# Patient Record
Sex: FEMALE | Race: WHITE | Employment: FULL TIME | URBAN - METROPOLITAN AREA
[De-identification: names, ages, dates, MRNs, and addresses within clinical notes are randomized per-mention and may not be internally consistent; named-entity substitution may affect disease eponyms.]

---

## 2021-10-31 ENCOUNTER — APPOINTMENT (OUTPATIENT)
Dept: GENERAL RADIOLOGY | Age: 41
End: 2021-10-31
Attending: STUDENT IN AN ORGANIZED HEALTH CARE EDUCATION/TRAINING PROGRAM
Payer: COMMERCIAL

## 2021-10-31 ENCOUNTER — HOSPITAL ENCOUNTER (OUTPATIENT)
Age: 41
Setting detail: OBSERVATION
Discharge: HOME | End: 2021-11-01
Attending: STUDENT IN AN ORGANIZED HEALTH CARE EDUCATION/TRAINING PROGRAM | Admitting: INTERNAL MEDICINE
Payer: COMMERCIAL

## 2021-10-31 ENCOUNTER — APPOINTMENT (OUTPATIENT)
Dept: MRI IMAGING | Age: 41
End: 2021-10-31
Attending: STUDENT IN AN ORGANIZED HEALTH CARE EDUCATION/TRAINING PROGRAM
Payer: COMMERCIAL

## 2021-10-31 ENCOUNTER — APPOINTMENT (OUTPATIENT)
Dept: CT IMAGING | Age: 41
End: 2021-10-31
Attending: STUDENT IN AN ORGANIZED HEALTH CARE EDUCATION/TRAINING PROGRAM
Payer: COMMERCIAL

## 2021-10-31 ENCOUNTER — HOSPITAL ENCOUNTER (EMERGENCY)
Dept: MRI IMAGING | Age: 41
Discharge: HOME | End: 2021-10-31
Attending: STUDENT IN AN ORGANIZED HEALTH CARE EDUCATION/TRAINING PROGRAM
Payer: COMMERCIAL

## 2021-10-31 DIAGNOSIS — Z86.73 HISTORY OF TRANSIENT ISCHEMIC ATTACK (TIA): ICD-10-CM

## 2021-10-31 DIAGNOSIS — R07.9 CHEST PAIN, UNSPECIFIED TYPE: ICD-10-CM

## 2021-10-31 DIAGNOSIS — M47.22 CERVICAL SPONDYLOSIS WITH RADICULOPATHY: ICD-10-CM

## 2021-10-31 DIAGNOSIS — R29.898 WEAKNESS OF LEFT ARM: Primary | ICD-10-CM

## 2021-10-31 DIAGNOSIS — E78.2 MIXED HYPERLIPIDEMIA: ICD-10-CM

## 2021-10-31 PROBLEM — M54.2 NECK PAIN: Status: ACTIVE | Noted: 2021-10-31

## 2021-10-31 PROBLEM — E87.5 HYPERKALEMIA: Status: ACTIVE | Noted: 2021-10-31

## 2021-10-31 PROBLEM — R20.0 FACIAL NUMBNESS: Status: ACTIVE | Noted: 2021-10-31

## 2021-10-31 PROBLEM — I10 HTN (HYPERTENSION), BENIGN: Status: ACTIVE | Noted: 2021-10-31

## 2021-10-31 PROBLEM — R20.0 LEFT SIDED NUMBNESS: Status: ACTIVE | Noted: 2021-10-31

## 2021-10-31 PROBLEM — R29.810 FACIAL DROOP: Status: ACTIVE | Noted: 2021-10-31

## 2021-10-31 LAB
ALBUMIN SERPL-MCNC: 3.4 G/DL (ref 3.5–5)
ALBUMIN/GLOB SERPL: 0.7 {RATIO} (ref 1.1–2.2)
ALP SERPL-CCNC: 82 U/L (ref 45–117)
ALT SERPL-CCNC: 25 U/L (ref 12–78)
ANION GAP SERPL CALC-SCNC: 6 MMOL/L (ref 5–15)
ANION GAP SERPL CALC-SCNC: 8 MMOL/L (ref 5–15)
APTT PPP: 23.6 SEC (ref 22.1–31)
AST SERPL-CCNC: 64 U/L (ref 15–37)
BASOPHILS # BLD: 0.1 K/UL (ref 0–0.1)
BASOPHILS NFR BLD: 1 % (ref 0–1)
BILIRUB SERPL-MCNC: 1 MG/DL (ref 0.2–1)
BUN SERPL-MCNC: 15 MG/DL (ref 6–20)
BUN SERPL-MCNC: 15 MG/DL (ref 6–20)
BUN/CREAT SERPL: 15 (ref 12–20)
BUN/CREAT SERPL: 15 (ref 12–20)
CALCIUM SERPL-MCNC: 8.5 MG/DL (ref 8.5–10.1)
CALCIUM SERPL-MCNC: 8.8 MG/DL (ref 8.5–10.1)
CHLORIDE SERPL-SCNC: 107 MMOL/L (ref 97–108)
CHLORIDE SERPL-SCNC: 108 MMOL/L (ref 97–108)
CO2 SERPL-SCNC: 23 MMOL/L (ref 21–32)
CO2 SERPL-SCNC: 24 MMOL/L (ref 21–32)
CREAT SERPL-MCNC: 1.02 MG/DL (ref 0.55–1.02)
CREAT SERPL-MCNC: 1.03 MG/DL (ref 0.55–1.02)
D DIMER PPP FEU-MCNC: 0.67 MG/L FEU (ref 0–0.65)
DIFFERENTIAL METHOD BLD: ABNORMAL
EOSINOPHIL # BLD: 0.1 K/UL (ref 0–0.4)
EOSINOPHIL NFR BLD: 1 % (ref 0–7)
ERYTHROCYTE [DISTWIDTH] IN BLOOD BY AUTOMATED COUNT: 16.6 % (ref 11.5–14.5)
GLOBULIN SER CALC-MCNC: 4.7 G/DL (ref 2–4)
GLUCOSE BLD STRIP.AUTO-MCNC: 95 MG/DL (ref 65–117)
GLUCOSE SERPL-MCNC: 86 MG/DL (ref 65–100)
GLUCOSE SERPL-MCNC: 90 MG/DL (ref 65–100)
HCT VFR BLD AUTO: 33.1 % (ref 35–47)
HGB BLD-MCNC: 10.6 G/DL (ref 11.5–16)
IMM GRANULOCYTES # BLD AUTO: 0 K/UL (ref 0–0.04)
IMM GRANULOCYTES NFR BLD AUTO: 0 % (ref 0–0.5)
INR PPP: 1 (ref 0.9–1.1)
LYMPHOCYTES # BLD: 2.4 K/UL (ref 0.8–3.5)
LYMPHOCYTES NFR BLD: 32 % (ref 12–49)
MCH RBC QN AUTO: 20.7 PG (ref 26–34)
MCHC RBC AUTO-ENTMCNC: 32 G/DL (ref 30–36.5)
MCV RBC AUTO: 64.6 FL (ref 80–99)
MONOCYTES # BLD: 0.6 K/UL (ref 0–1)
MONOCYTES NFR BLD: 8 % (ref 5–13)
NEUTS SEG # BLD: 4.2 K/UL (ref 1.8–8)
NEUTS SEG NFR BLD: 58 % (ref 32–75)
NRBC # BLD: 0 K/UL (ref 0–0.01)
NRBC BLD-RTO: 0 PER 100 WBC
PLATELET # BLD AUTO: 461 K/UL (ref 150–400)
PLATELET COMMENTS,PCOM: ABNORMAL
PMV BLD AUTO: 10.7 FL (ref 8.9–12.9)
POTASSIUM SERPL-SCNC: 3.5 MMOL/L (ref 3.5–5.1)
POTASSIUM SERPL-SCNC: 7 MMOL/L (ref 3.5–5.1)
PROT SERPL-MCNC: 8.1 G/DL (ref 6.4–8.2)
PROTHROMBIN TIME: 10.6 SEC (ref 9–11.1)
RBC # BLD AUTO: 5.12 M/UL (ref 3.8–5.2)
RBC MORPH BLD: ABNORMAL
SERVICE CMNT-IMP: NORMAL
SODIUM SERPL-SCNC: 136 MMOL/L (ref 136–145)
SODIUM SERPL-SCNC: 140 MMOL/L (ref 136–145)
THERAPEUTIC RANGE,PTTT: NORMAL SECS (ref 58–77)
TROPONIN-HIGH SENSITIVITY: 7 NG/L (ref 0–51)
WBC # BLD AUTO: 7.4 K/UL (ref 3.6–11)

## 2021-10-31 PROCEDURE — G0378 HOSPITAL OBSERVATION PER HR: HCPCS

## 2021-10-31 PROCEDURE — 0042T CT CODE NEURO PERF W CBF: CPT

## 2021-10-31 PROCEDURE — 85379 FIBRIN DEGRADATION QUANT: CPT

## 2021-10-31 PROCEDURE — 94762 N-INVAS EAR/PLS OXIMTRY CONT: CPT

## 2021-10-31 PROCEDURE — 85610 PROTHROMBIN TIME: CPT

## 2021-10-31 PROCEDURE — 99285 EMERGENCY DEPT VISIT HI MDM: CPT

## 2021-10-31 PROCEDURE — 70450 CT HEAD/BRAIN W/O DYE: CPT

## 2021-10-31 PROCEDURE — 36415 COLL VENOUS BLD VENIPUNCTURE: CPT

## 2021-10-31 PROCEDURE — 72156 MRI NECK SPINE W/O & W/DYE: CPT

## 2021-10-31 PROCEDURE — 99218 HC RM OBSERVATION: CPT

## 2021-10-31 PROCEDURE — 70553 MRI BRAIN STEM W/O & W/DYE: CPT

## 2021-10-31 PROCEDURE — 74011250637 HC RX REV CODE- 250/637: Performed by: STUDENT IN AN ORGANIZED HEALTH CARE EDUCATION/TRAINING PROGRAM

## 2021-10-31 PROCEDURE — 74011000636 HC RX REV CODE- 636: Performed by: RADIOLOGY

## 2021-10-31 PROCEDURE — 82962 GLUCOSE BLOOD TEST: CPT

## 2021-10-31 PROCEDURE — 85730 THROMBOPLASTIN TIME PARTIAL: CPT

## 2021-10-31 PROCEDURE — 70496 CT ANGIOGRAPHY HEAD: CPT

## 2021-10-31 PROCEDURE — 93005 ELECTROCARDIOGRAM TRACING: CPT

## 2021-10-31 PROCEDURE — 71045 X-RAY EXAM CHEST 1 VIEW: CPT

## 2021-10-31 PROCEDURE — 74011250636 HC RX REV CODE- 250/636: Performed by: STUDENT IN AN ORGANIZED HEALTH CARE EDUCATION/TRAINING PROGRAM

## 2021-10-31 PROCEDURE — 74011250636 HC RX REV CODE- 250/636: Performed by: INTERNAL MEDICINE

## 2021-10-31 PROCEDURE — A9575 INJ GADOTERATE MEGLUMI 0.1ML: HCPCS | Performed by: RADIOLOGY

## 2021-10-31 PROCEDURE — 85025 COMPLETE CBC W/AUTO DIFF WBC: CPT

## 2021-10-31 PROCEDURE — 80053 COMPREHEN METABOLIC PANEL: CPT

## 2021-10-31 PROCEDURE — 74011250636 HC RX REV CODE- 250/636: Performed by: RADIOLOGY

## 2021-10-31 PROCEDURE — 84484 ASSAY OF TROPONIN QUANT: CPT

## 2021-10-31 RX ORDER — SODIUM CHLORIDE 9 MG/ML
75 INJECTION, SOLUTION INTRAVENOUS CONTINUOUS
Status: DISPENSED | OUTPATIENT
Start: 2021-10-31 | End: 2021-11-01

## 2021-10-31 RX ORDER — ASPIRIN 325 MG
325 TABLET ORAL ONCE
Status: COMPLETED | OUTPATIENT
Start: 2021-10-31 | End: 2021-10-31

## 2021-10-31 RX ORDER — GUAIFENESIN 100 MG/5ML
81 LIQUID (ML) ORAL DAILY
Status: DISCONTINUED | OUTPATIENT
Start: 2021-11-01 | End: 2021-11-01 | Stop reason: HOSPADM

## 2021-10-31 RX ORDER — ACETAMINOPHEN 325 MG/1
650 TABLET ORAL
Status: DISCONTINUED | OUTPATIENT
Start: 2021-10-31 | End: 2021-11-01 | Stop reason: HOSPADM

## 2021-10-31 RX ORDER — GADOTERATE MEGLUMINE 376.9 MG/ML
16 INJECTION INTRAVENOUS
Status: COMPLETED | OUTPATIENT
Start: 2021-10-31 | End: 2021-10-31

## 2021-10-31 RX ORDER — ACETAMINOPHEN 650 MG/1
650 SUPPOSITORY RECTAL
Status: DISCONTINUED | OUTPATIENT
Start: 2021-10-31 | End: 2021-11-01 | Stop reason: HOSPADM

## 2021-10-31 RX ADMIN — SODIUM CHLORIDE 75 ML/HR: 9 INJECTION, SOLUTION INTRAVENOUS at 20:55

## 2021-10-31 RX ADMIN — IOPAMIDOL 40 ML: 755 INJECTION, SOLUTION INTRAVENOUS at 17:14

## 2021-10-31 RX ADMIN — IOPAMIDOL 100 ML: 755 INJECTION, SOLUTION INTRAVENOUS at 17:14

## 2021-10-31 RX ADMIN — SODIUM CHLORIDE 1000 ML: 9 INJECTION, SOLUTION INTRAVENOUS at 17:38

## 2021-10-31 RX ADMIN — ASPIRIN 325 MG: 325 TABLET, FILM COATED ORAL at 17:38

## 2021-10-31 RX ADMIN — GADOTERATE MEGLUMINE 16 ML: 376.9 INJECTION, SOLUTION INTRAVENOUS at 18:48

## 2021-10-31 NOTE — H&P
[address]  [phone]    Admission History and Physical      NAME:       Rosa Harris   :       1980   MRN:      [id number]     PCP:      None     Date of service:   10/31/2021     Chief  Complaint: Left sided arm weakness, numbness    History Of Presenting Illness:       Ms. Houston Dubose is a 35 y.o. female who is being admitted for left sided numbness. Ms. Houston Dubose presented to our Emergency Department today complaining of a persistent left sided arm numbness and weakness. She also noted a left facial numbness also. At some point she was unable to hold a cup with her left hand. No speech changes or focal weakness. She had a TIA in september and was due to follow up with neurology and has been taking asprin, lipitor and plavix. CT code neuro, CTA head and neck were unremarkable. She has also been experiencing chest pressure associated with sone dyspnea. No fever or chills. She is fully vaccinated against CoV-19. She will be observed in hospital for further testing. No Known Allergies    Prior to Admission medications    Not on File       Past Medical History:   Diagnosis Date    HTN (hypertension), benign         No past surgical history on file. Social History     Tobacco Use    Smoking status: Never Smoker   Substance Use Topics    Alcohol use: Not on file        Family History   Problem Relation Age of Onset    Stroke Neg Hx         Review of Systems:    Constitutional ROS: no fever, chills, rigors or night sweats  Respiratory ROS: no cough, sputum, hemoptysis, dyspnea or pleuritic pain. Cardiovascular ROS: + chest pain but no palpitations, orthopnea, PND or syncope  Endocrine ROS: no polydispsia, polyuria, heat or cold intolerance or major weight change.   Gastrointestinal ROS: no dysphagia, abdominal pain, nausea, vomiting or diarrhea    Genito-Urinary ROS: no dysuria, frequency, hematuria, retention or flank pain  Musculoskeletal ROS: no joint pain, swelling or muscular tenderness  Neurological ROS: no headache, confusion, focal weakness   Psychiatric ROS: no depression, anxiety, mood swings  Dermatological ROS: no rash, pruritis, or urticaria  Heme-Lymph ROS: no swollen glands, bleeding    Examination:    Constitutional:    Visit Vitals  /73   Pulse 71   Temp 97.7 °F (36.5 °C)   Resp 16   Ht 5' 8\" (1.727 m)   Wt 84.4 kg (186 lb)   SpO2 100%   BMI 28.28 kg/m²         General:  Weak and ill looking patient in no acute distress  Eyes: Pink conjunctivae, PERRLA with no discharge. Normal eye movements  Ear, Nose, Mouth & Throat: No ottorrhea, rhinorrhea, non tender sinuses, dry mucous membranes  Respiratory:  No accessory muscle use, clear breath sounds without crackles or wheezes  Cardiovascular:  No JVD or murmurs, regular and normal S1, S2 without thrills, bruits or peripheral edema. GI & :  Soft abdomen, non-tender, non-distended, normoactive bowel sounds with no palpable organomegaly  Heme:  No cervical or axillary adenopathy. Musculoskeletal:  No cyanosis, clubbing, atrophy or deformities  Skin:  No rashes, bruising or ulcers   Neurological: Awake and alert, speech is clear, CNs 2-12 are grossly intact. Decreased power left side 4/5. Mild sensosory changes with no dermatomal pattern   Psychiatric:  Has a good insight and is oriented x 3  ________________________________________________________________________    Data Review:    Labs:    Recent Labs     10/31/21  1725   WBC 7.4   HGB 10.6*   HCT 33.1*   *     Recent Labs     10/31/21  1725      K 7.0*      CO2 23   GLU 86   BUN 15   CREA 1.03*   CA 8.8   ALB 3.4*   ALT 25     No components found for: GLPOC  No results for input(s): PH, PCO2, PO2, HCO3, FIO2 in the last 72 hours.   Recent Labs 10/31/21  1800   INR 1.0       Imaging Studies:      CT scans - reviewed     Personally reviewed 12 lead EKG: Normal rate, rhythm, axis and intervals. and No acute changes suggestive of ischemia    I have also reviewed available old medical records. Assessment & Impression:     Ms. Ashley Angela is a 35 y.o. female being evaluated for:     Active Problems:    Left sided numbness (10/31/2021)      Neck pain (10/31/2021)      Hyperkalemia (10/31/2021)      History of transient ischemic attack (TIA) (10/31/2021)      Chest pain (10/31/2021)      HTN (hypertension), benign (10/31/2021)      Facial numbness (10/31/2021)         Plan of management:    Left sided numbness (10/31/2021) / Neck pain (10/31/2021) /  Facial numbness (10/31/2021) / History of transient ischemic attack (TIA) (10/31/2021) POA: apparently had a TIA in 9/2021 in [place]. Had 'complete' work up done. CT scan code neuro, CTA head and neck and prelim MRi head and neck unremarkable. Observe in hospital. Consult neurology. Chest pain (10/31/2021) POA: associated with SOB. She says she had a complete work up including holter monitoring around 2018 in [facility]. Serial troponin. Consult cardiology. Resume Asprin    HTN (hypertension), benign (10/31/2021) POA: resume Lisinopril     Hyperkalemia (10/31/2021) POA: due to hemolysis.  Discussed with nurse who will re-draw blood sample to repeat test    Code Status:  Full    Surrogate decision maker: Family    Risk of deterioration: high      Total time spent for the care of the patient: [address] discussed with: Patient, Family, Nursing Staff and ED physician    Discussed:  Code Status, Care Plan and D/C Planning    Prophylaxis:  SCD's    Probable Disposition:  Home w/Family           ___________________________________________________    Attending Physician: Payal Porras MD

## 2021-10-31 NOTE — ED PROVIDER NOTES
Patient is a 64-year-old female with a history of TIA in September 2021, was scheduled to see a neurologist this upcoming week, who presents to the emergency department chief complaint of left arm weakness and left facial droop. She was last her baseline self at 9:30 PM last night when she went to bed. States she woke up around 2 AM and noticed her left arm had numbness and tingling and was weaker than usual.  Also had some neck \"tightness, as well as some mild chest discomfort last night prior to going to sleep. None currently. No past medical history on file. No past surgical history on file. No family history on file. Social History     Socioeconomic History    Marital status:      Spouse name: Not on file    Number of children: Not on file    Years of education: Not on file    Highest education level: Not on file   Occupational History    Not on file   Tobacco Use    Smoking status: Not on file   Substance and Sexual Activity    Alcohol use: Not on file    Drug use: Not on file    Sexual activity: Not on file   Other Topics Concern    Not on file   Social History Narrative    Not on file     Social Determinants of Health     Financial Resource Strain:     Difficulty of Paying Living Expenses:    Food Insecurity:     Worried About Running Out of Food in the Last Year:     [address] in the Last Year:    Transportation Needs:     Lack of Transportation (Medical):      Lack of Transportation (Non-Medical):    Physical Activity:     Days of Exercise per Week:     Minutes of Exercise per Session:    Stress:     Feeling of Stress :    Social Connections:     Frequency of Communication with Friends and Family:     Frequency of Social Gatherings with Friends and Family:     Attends L[employer] Services:     Active Member of Clubs or Organizations:     Attends Club or Organization Meetings:     Marital Status:    Intimate Partner Violence:     Fear of Current or Ex-Partner:     Emotionally Abused:     Physically Abused:     Sexually Abused: ALLERGIES: Patient has no allergy information on record. Review of Systems   Constitutional: Negative for fever. Respiratory: Negative for cough and shortness of breath. Cardiovascular: Positive for chest pain. Gastrointestinal: Negative for abdominal pain and vomiting. Genitourinary: Negative for dysuria. Musculoskeletal: Positive for neck pain. Negative for back pain. Skin: Negative for rash. Neurological: Positive for facial asymmetry, weakness and numbness. Negative for syncope and headaches. See HPI   All other systems reviewed and are negative. There were no vitals filed for this visit. Physical Exam  Vitals and nursing note reviewed. Constitutional:       General: She is not in acute distress. Appearance: She is well-developed. HENT:      Head: Normocephalic and atraumatic. Eyes:      Conjunctiva/sclera: Conjunctivae normal.   Cardiovascular:      Rate and Rhythm: Normal rate and regular rhythm. Heart sounds: Normal heart sounds. Pulmonary:      Effort: Pulmonary effort is normal. No respiratory distress. Breath sounds: Normal breath sounds. Abdominal:      Palpations: Abdomen is soft. Tenderness: There is no abdominal tenderness. There is no guarding. Musculoskeletal:         General: Normal range of motion. Cervical back: Normal range of motion and neck supple. Skin:     General: Skin is warm and dry. Neurological:      Mental Status: She is alert and oriented to person, place, and time. Cranial Nerves: No dysarthria. Sensory: No sensory deficit. Motor: Weakness (4+/5 motor strength in LUE. All others 5/5. ) and pronator drift (Left) present. No abnormal muscle tone.           MDM  Number of Diagnoses or Management Options  Weakness of left arm: new and requires workup  Risk of Complications, Morbidity, and/or Mortality  Presenting problems: high  Diagnostic procedures: high  Management options: high  General comments: Total critical care time spend exclusive of procedures:  31 minutes. Procedures    EKG interpretation: 4:39 PM  Rhythm: normal sinus rhythm; and regular . Rate (approx.): 76; Axis: normal; Intervals: normal ; ST/T wave: normal; EKG documented and interpreted by Flores Eddy MD, ED MD.      5:13 PM  I spoke with Dr. Koko Feng, the telemetry neurologist on call. Patient outside the TPA window. Recommends bringing in for full stroke work-up. Would also obtain MRIs of the brain and cervical spine with and without contrast to look for any demyelinating process. Normal saline, aspirin, permissive hypertension. Perfect Serve Consult for Admission  6:05 PM    ED Room Number: ER12/12  Patient Name and age:  Ludmila Falcon 35 y.o.  female  Working Diagnosis:   1. Weakness of left arm        COVID-19 Suspicion:  no  Sepsis present:  no  Reassessment needed: no  Code Status:  Full Code  Readmission: no  Isolation Requirements:  no  Recommended Level of Care:  telemetry  Department:[facility] ED - [phone]  Other: Presented as a Code S, level 2. Teleneurology recommends admission for stroke work-up and rule out, also recommends MRIs with and without contrast (I've ordered them) to look for demyelination.

## 2021-10-31 NOTE — ED TRIAGE NOTES
Patient arrives with c/o of intermittent midsternal chest pain, left sided head/neck \"pressure\", left facial and arm numbness, and left sided weakness. Denies change in vision, speech. Hx of TIA one month ago at [facility] ED. Was prescribed plavix for 20 days. Finished plavix last week. Signs and symptoms: See above  VAN score: Negative  Last Known Well: 0200  Blood Glucose (EMS acceptable): 95   Blood Pressure (EMS acceptable): 146/87  Anticoagulants: No. Took plavix until last week     Code Stroke Level 2 initiated at 1649.

## 2021-11-01 ENCOUNTER — APPOINTMENT (OUTPATIENT)
Dept: NON INVASIVE DIAGNOSTICS | Age: 41
End: 2021-11-01
Attending: INTERNAL MEDICINE
Payer: COMMERCIAL

## 2021-11-01 ENCOUNTER — APPOINTMENT (OUTPATIENT)
Dept: NON INVASIVE DIAGNOSTICS | Age: 41
End: 2021-11-01
Attending: SPECIALIST
Payer: COMMERCIAL

## 2021-11-01 VITALS
SYSTOLIC BLOOD PRESSURE: 124 MMHG | DIASTOLIC BLOOD PRESSURE: 82 MMHG | BODY MASS INDEX: 28.19 KG/M2 | OXYGEN SATURATION: 99 % | HEIGHT: 68 IN | HEART RATE: 72 BPM | TEMPERATURE: 98.2 F | WEIGHT: 186 LBS | RESPIRATION RATE: 20 BRPM

## 2021-11-01 PROBLEM — M47.22 CERVICAL SPONDYLOSIS WITH RADICULOPATHY: Status: ACTIVE | Noted: 2021-11-01

## 2021-11-01 LAB
ATRIAL RATE: 76 BPM
CALCULATED P AXIS, ECG09: 48 DEGREES
CALCULATED R AXIS, ECG10: 51 DEGREES
CALCULATED T AXIS, ECG11: 47 DEGREES
CHOLEST SERPL-MCNC: 174 MG/DL
DIAGNOSIS, 93000: NORMAL
ECHO AV ANNULUS DIAM: 2.53 CM
ECHO AV AREA PEAK VELOCITY: 1.53 CM2
ECHO AV AREA/BSA PEAK VELOCITY: 0.8 CM2/M2
ECHO AV PEAK GRADIENT: 9.66 MMHG
ECHO AV PEAK VELOCITY: 155.43 CM/S
ECHO EST RA PRESSURE: 5 MMHG
ECHO LA MAJOR AXIS: 3.8 CM
ECHO LA MINOR AXIS: 1.92 CM
ECHO LV E' LATERAL VELOCITY: 12.6 CM/S
ECHO LV E' SEPTAL VELOCITY: 9.88 CM/S
ECHO LV INTERNAL DIMENSION DIASTOLIC: 4.66 CM (ref 3.9–5.3)
ECHO LV INTERNAL DIMENSION SYSTOLIC: 2.55 CM
ECHO LV IVSD: 1.26 CM (ref 0.6–0.9)
ECHO LV MASS 2D: 188.5 G (ref 67–162)
ECHO LV MASS INDEX 2D: 95.2 G/M2 (ref 43–95)
ECHO LV POSTERIOR WALL DIASTOLIC: 0.97 CM (ref 0.6–0.9)
ECHO LVOT DIAM: 1.87 CM
ECHO LVOT PEAK GRADIENT: 3.01 MMHG
ECHO LVOT PEAK VELOCITY: 86.71 CM/S
ECHO MV A VELOCITY: 71.21 CM/S
ECHO MV E DECELERATION TIME (DT): 125.71 MS
ECHO MV E VELOCITY: 89.47 CM/S
ECHO MV E/A RATIO: 1.26
ECHO MV E/E' LATERAL: 7.1
ECHO MV E/E' RATIO (AVERAGED): 8.08
ECHO MV E/E' SEPTAL: 9.06
ECHO PV PEAK INSTANTANEOUS GRADIENT SYSTOLIC: 2.48 MMHG
ECHO RIGHT VENTRICULAR SYSTOLIC PRESSURE (RVSP): 29.73 MMHG
ECHO TV REGURGITANT MAX VELOCITY: 248.65 CM/S
ECHO TV REGURGITANT PEAK GRADIENT: 24.73 MMHG
EST. AVERAGE GLUCOSE BLD GHB EST-MCNC: 120 MG/DL
HBA1C MFR BLD: 5.8 % (ref 4–5.6)
HDLC SERPL-MCNC: 52 MG/DL
HDLC SERPL: 3.3 {RATIO} (ref 0–5)
LDLC SERPL CALC-MCNC: 99.2 MG/DL (ref 0–100)
P-R INTERVAL, ECG05: 152 MS
Q-T INTERVAL, ECG07: 398 MS
QRS DURATION, ECG06: 82 MS
QTC CALCULATION (BEZET), ECG08: 447 MS
STRESS ANGINA INDEX: 0
STRESS BASELINE DIAS BP: 82 MMHG
STRESS BASELINE HR: 75 BPM
STRESS BASELINE SYS BP: 124 MMHG
STRESS ESTIMATED WORKLOAD: 10.1 METS
STRESS EXERCISE DUR MIN: NORMAL
STRESS PEAK DIAS BP: 90 MMHG
STRESS PEAK SYS BP: 160 MMHG
STRESS PERCENT HR ACHIEVED: 88 %
STRESS POST PEAK HR: 155 BPM
STRESS RATE PRESSURE PRODUCT: NORMAL BPM*MMHG
STRESS TARGET HR: 177 BPM
TRIGL SERPL-MCNC: 114 MG/DL (ref ?–150)
TROPONIN-HIGH SENSITIVITY: 8 NG/L (ref 0–51)
VENTRICULAR RATE, ECG03: 76 BPM
VLDLC SERPL CALC-MCNC: 22.8 MG/DL

## 2021-11-01 PROCEDURE — 83036 HEMOGLOBIN GLYCOSYLATED A1C: CPT

## 2021-11-01 PROCEDURE — 74011250637 HC RX REV CODE- 250/637: Performed by: PSYCHIATRY & NEUROLOGY

## 2021-11-01 PROCEDURE — 99218 HC RM OBSERVATION: CPT

## 2021-11-01 PROCEDURE — G0378 HOSPITAL OBSERVATION PER HR: HCPCS

## 2021-11-01 PROCEDURE — 97165 OT EVAL LOW COMPLEX 30 MIN: CPT

## 2021-11-01 PROCEDURE — 99243 OFF/OP CNSLTJ NEW/EST LOW 30: CPT | Performed by: PSYCHIATRY & NEUROLOGY

## 2021-11-01 PROCEDURE — 74011250637 HC RX REV CODE- 250/637: Performed by: INTERNAL MEDICINE

## 2021-11-01 PROCEDURE — 93306 TTE W/DOPPLER COMPLETE: CPT | Performed by: SPECIALIST

## 2021-11-01 PROCEDURE — 93018 CV STRESS TEST I&R ONLY: CPT | Performed by: SPECIALIST

## 2021-11-01 PROCEDURE — 97535 SELF CARE MNGMENT TRAINING: CPT

## 2021-11-01 PROCEDURE — 93017 CV STRESS TEST TRACING ONLY: CPT

## 2021-11-01 PROCEDURE — 84484 ASSAY OF TROPONIN QUANT: CPT

## 2021-11-01 PROCEDURE — 93016 CV STRESS TEST SUPVJ ONLY: CPT | Performed by: SPECIALIST

## 2021-11-01 PROCEDURE — 97161 PT EVAL LOW COMPLEX 20 MIN: CPT

## 2021-11-01 PROCEDURE — 80061 LIPID PANEL: CPT

## 2021-11-01 PROCEDURE — 93306 TTE W/DOPPLER COMPLETE: CPT

## 2021-11-01 PROCEDURE — 36415 COLL VENOUS BLD VENIPUNCTURE: CPT

## 2021-11-01 PROCEDURE — 99244 OFF/OP CNSLTJ NEW/EST MOD 40: CPT | Performed by: SPECIALIST

## 2021-11-01 PROCEDURE — 97116 GAIT TRAINING THERAPY: CPT

## 2021-11-01 RX ORDER — GUAIFENESIN 100 MG/5ML
81 LIQUID (ML) ORAL DAILY
Qty: 30 TABLET | Refills: 1 | Status: SHIPPED | OUTPATIENT
Start: 2021-11-02 | End: 2021-12-02

## 2021-11-01 RX ORDER — ATORVASTATIN CALCIUM 20 MG/1
20 TABLET, FILM COATED ORAL DAILY
Status: DISCONTINUED | OUTPATIENT
Start: 2021-11-01 | End: 2021-11-01 | Stop reason: HOSPADM

## 2021-11-01 RX ORDER — ATORVASTATIN CALCIUM 20 MG/1
20 TABLET, FILM COATED ORAL DAILY
Qty: 30 TABLET | Refills: 1 | Status: SHIPPED | OUTPATIENT
Start: 2021-11-02 | End: 2021-12-02

## 2021-11-01 RX ORDER — CYCLOBENZAPRINE HCL 10 MG
10 TABLET ORAL 2 TIMES DAILY
Qty: 28 TABLET | Refills: 0 | Status: SHIPPED | OUTPATIENT
Start: 2021-11-01 | End: 2021-11-15

## 2021-11-01 RX ADMIN — ATORVASTATIN CALCIUM 20 MG: 20 TABLET, FILM COATED ORAL at 14:37

## 2021-11-01 RX ADMIN — ASPIRIN 81 MG: 81 TABLET, CHEWABLE ORAL at 08:50

## 2021-11-01 NOTE — PROGRESS NOTES
OCCUPATIONAL THERAPY EVALUATION/DISCHARGE  Patient: Trinity Gatica (46 y.o. female)  Date: 11/1/2021  Primary Diagnosis: Left sided numbness [R20.0]       Precautions:    (BEFAST)    ASSESSMENT  Based on the objective data described below, the patient presents with near baseline ADL performance and mobility following admission for L side weakness and L facial and UE numbness. MRI negative for acute infarct and pt with h/o TIA 1 month ago. Today pt received in bed, reporting improvement in symptoms. She is able to demonstrate functional and equal ROM, coordination, and strength in bilateral UEs to complete serial ADL tasks. Pt mobilizes to bathroom for toileting and grooming with independence. No LOB noted during standing portions of ADLs. No visual deficits noted. Pt receptive to all education, including BEFAST. No further skilled OT services indicated at this time. Current Level of Function (ADLs/self-care): independent    Functional Outcome Measure: The patient scored Total A-D  Total A-D (Motor Function): 66/66 on the Fugl-Mccullough Assessment which is indicative of no impairment in upper extremity functional status. Other factors to consider for discharge: recent TIA; active and works     PLAN :  Recommendation for discharge: (in order for the patient to meet his/her long term goals)  No skilled occupational therapy/ follow up rehabilitation needs identified at this time. This discharge recommendation:  Has been made in collaboration with the attending provider and/or case management    IF patient discharges home will need the following DME: none       SUBJECTIVE:   Patient stated I just went through this a month ago.     OBJECTIVE DATA SUMMARY:   HISTORY:   Past Medical History:   Diagnosis Date    HTN (hypertension), benign    No past surgical history on file.     Prior Level of Function/Environment/Context: active; independent; works as a   Expanded or extensive additional review of patient history:     Home Situation  Home Environment: Private residence  # Steps to Enter: 6  Rails to Enter: Yes  One/Two Story Residence: One story  Living Alone: No  Support Systems: Spouse/Significant Other, Child(alexandra)  Patient Expects to be Discharged to[de-identified] House  Current DME Used/Available at Home: None  Tub or Shower Type: Shower    Hand dominance: Left    EXAMINATION OF PERFORMANCE DEFICITS:  Cognitive/Behavioral Status:  Neurologic State: Alert  Orientation Level: Oriented X4  Cognition: Appropriate decision making; Appropriate for age attention/concentration; Appropriate safety awareness; Follows commands  Perception: Appears intact  Perseveration: No perseveration noted  Safety/Judgement: Awareness of environment; Fall prevention;Good awareness of safety precautions; Home safety; Insight into deficits    Hearing: Auditory  Auditory Impairment: None    Vision/Perceptual:    Tracking: Able to track stimulus in all quadrants w/o difficulty    Visual Fields:  (WFLs)  Diplopia: No    Acuity: Within Defined Limits      Range of Motion:  WFLs    Strength:  WFLs    Coordination:  WFLs  Fine Motor Skills-Upper: Left Intact; Right Intact    Gross Motor Skills-Upper: Left Intact; Right Intact    Balance:  Sitting: Intact  Standing: Intact    Functional Mobility and Transfers for ADLs:  Bed Mobility:  Rolling: Independent  Supine to Sit: Independent  Sit to Supine: Independent  Scooting: Independent    Transfers:  Sit to Stand: Independent  Stand to Sit: Independent  Toilet Transfer : Independent    ADL Assessment:  Feeding: Independent    Oral Facial Hygiene/Grooming: Independent    Bathing: Independent    Upper Body Dressing: Independent    Lower Body Dressing: Independent    Toileting: Independent    ADL Intervention and task modifications:  Grooming  Grooming Assistance: Independent  Position Performed: Standing  Washing Hands: Independent    Toileting  Toileting Assistance: Independent  Bladder Hygiene: Independent  Bowel Hygiene: Independent  Clothing Management: Independent    Cognitive Retraining  Safety/Judgement: Awareness of environment; Fall prevention;Good awareness of safety precautions; Home safety; Insight into deficits    Functional Measure:  Fugl-Mccullough Assessment of Motor Recovery after Stroke:   Reflex Activity  Flexors/Biceps/Fingers: Can be elicited  Extensors/Triceps: Can be elicited  Reflex Subtotal: 4    Volitional Movement Within Synergies  Shoulder Retraction: Full  Shoulder Elevation: Full  Shoulder Abduction (90 degrees): Full  Shoulder External Rotation: Full  Elbow Flexion: Full  Forearm Supination: Full  Shoulder Adduction/Internal Rotation: Full  Elbow Extension: Full  Forearm Pronation: Full  Subtotal: 18    Volitional Movement Mixing Synergies  Hand to Lumbar Spine: Full  Shoulder Flexion (0-90 degrees): Full  Pronation-Supination: Full  Subtotal: 6    Volitional Movement With Little or No Synergy  Shoulder Abduction (0-90 degrees): Full  Shoulder Flexion ( degrees): Full  Pronation/Supination: Full  Subtotal : 6    Normal Reflex Activity  Biceps, Triceps, Finger Flexors: Full  Subtotal : 2    Upper Extremity Total   Upper Extremity Total: 36    Wrist  Stability at 15 Degree Dorsiflexion: Full  Repeated Dorsiflexion/ Volar Flexion: Full  Stability at 15 Degree Dorsiflexion: Full  Repeated Dorsiflexion/ Volar Flexion: Full  Circumduction: Full  Wrist Total: 10    Hand  Mass Flexion: Full  Mass Extension: Full  Grasp A: Full  Grasp B: Full  Grasp C: Full  Grasp D: Full  Grasp E: Full  Hand Total: 14    Coordination/Speed  Tremor: None  Dysmetria: None  Time: <1s  Coordination/Speed Total : 6    Total A-D  Total A-D (Motor Function): 66/66     This is a reliable/valid measure of arm function after a neurological event. It has established value to characterize functional status and for measuring spontaneous and therapy-induced recovery; tests proximal and distal motor functions. Fugl-Mccullough Assessment  UE scores recorded between five and 30 days post neurologic event can be used to predict UE recovery at six months post neurologic event. Severe = 0-21 points   Moderately Severe = 22-33 points   Moderate = 34-47 points   Mild = 48-66 points  ELEN Duarte, YA Brandt, & BOB Tai (1992). Measurement of motor recovery after stroke: Outcome assessment and sample size requirements.  Stroke, 23, pp. 4841-2947.   ------------------------------------------------------------------------------------------------------------------------------------------------------------------  MCID:  Stroke:   Cortez Valenzuela et al, 2001; n = 171; mean age 79 (6) years; assessed within 16 (12) days of stroke, Acute Stroke)  FMA Motor Scores from Admission to Discharge   10 point increase in FMA Upper Extremity = 1.5 change in discharge FIM   10 point increase in FMA Lower Extremity = 1.9 change in discharge FIM  MDC:   Stroke:   Talya Gauthier et al, 2008, n = 14, mean age = 59.9 (14.6) years, assessed on average 14 (6.5) months post stroke, Chronic Stroke)   FMA = 5.2 points for the Upper Extremity portion of the assessment     Occupational Therapy Evaluation Charge Determination   History Examination Decision-Making   LOW Complexity : Brief history review  LOW Complexity : 1-3 performance deficits relating to physical, cognitive , or psychosocial skils that result in activity limitations and / or participation restrictions  LOW Complexity : No comorbidities that affect functional and no verbal or physical assistance needed to complete eval tasks       Based on the above components, the patient evaluation is determined to be of the following complexity level: LOW   Pain Rating:  Pt reporting minimal pain    Activity Tolerance:   Good    After treatment patient left in no apparent distress:    Sitting in chair and Call bell within reach    COMMUNICATION/EDUCATION:   The patients plan of care was discussed with: Physical therapist and Registered nurse. Patient and/or family was verbally educated on the BE FAST acronym for signs/symptoms of CVA and TIA. Informed patient to refer to the Stroke Binder for further BE FAST information. All questions answered with patient indicating good understanding.      Thank you for this referral.  Shashi Hi OT  Time Calculation: 23 mins

## 2021-11-01 NOTE — PROGRESS NOTES
Spiritual Care Partner Volunteer visited patient at [address] in [address] [place], [place] [address]y 2 on 11/1/2021   Documented by:  CRYSTAL AngelDiv.  [phone])

## 2021-11-01 NOTE — PROGRESS NOTES
TRANSFER - IN REPORT:    Verbal report received from SALLIE Castillo on Carmela Elena being received from ED for routine progression of care      Report consisted of patients Situation, Background, Assessment and   Recommendations(SBAR). Information from the following report(s) SBAR, Intake/Output, MAR, Recent Results, Med Rec Status and Cardiac Rhythm . was reviewed with the receiving nurse. Opportunity for questions and clarification was provided. Assessment completed upon patients arrival to unit and care assumed. Stroke education provided to patient and the following topics were discussed    1. Patients personal risk factors for stroke are hypertension    2. Warning signs of stroke:        * Sudden numbness or weakness of the face, arm or leg, especially on one side of the body            * Sudden confusion, trouble speaking or understanding        * Sudden trouble seeing in one or both eyes        * Sudden trouble walking, dizziness, loss of balance or coordination        * Sudden severe headache with no known cause      3. Importance of activation emergency medicalsServices (911) immediately if experience any warning signs of stroke. 4. Be sure and schedule a follow-up appointment with your primary care doctor or any specialists as instructed. 5. You must take medicine every day to treat your risk factors for stroke. Be sure to take your medicines exactly as your doctor tells you: no more, no less. Know what your medicines are for, what they do. Anti-thrombotics /anticoagulants can help prevent strokes. You are taking the following medicine(s): none. 6.  Smoking and second-hand smoke greatly increase your risk of stroke, cardiovascular disease and death. Smoking history never. 7. Information provided was [facility] Stroke Education Binder, Stroke Handouts or Verbal Education.     8. Documentation of teaching completed in Patient Education Activity and on Care Plan with teaching response noted? Yes. This patient was assisted with intentional toileting every 2 hours during this shift as appropriate. Documentation of ambulation and output reflected on flow sheet as appropriate. Purposeful hourly rounding was completed using AIDET and 5 Ps. Outcomes of PHR documented as they occurred. Bed alarm in use as appropriate. Dual suction and ambubag in place. 0700 - Bedside and verbal shift change report given to Emma Albarado RN (oncoming nurse) by Maya Rosas RN (offgoing nurse). Report included the following information: SBAR, Kardex, Intake/Output, MAR, Recent Results, and Med Rec Status.

## 2021-11-01 NOTE — ROUTINE PROCESS
SLP spoke with patient. She had no speech or swallowing concerns. On a regular diet, after she passed the STAND. C/o headache and tinnitus. REferred her to MD. SLP evaluation deferred at this time.

## 2021-11-01 NOTE — DISCHARGE SUMMARY
Sarath Arnoldelsen polly [place] 77  [address]  Tel: [phone]    Physician Discharge Summary    Patient ID:    Milka Maher  Age:              35 y.o.    : 1980  MRN:             [id number]     PCP: None     Date of Admission: 10/31/2021    Date of Discharge:  2021    Principal admission Diagnosis:   Left sided numbness [R20.0]    Discharge Diagnoses:  Principal Problem:    Cervical spondylosis with radiculopathy (2021)    Left sided numbness (10/31/2021)    Neck pain (10/31/2021)    History of transient ischemic attack (TIA) (10/31/2021)    Chest pain (10/31/2021)    HTN (hypertension), benign (10/31/2021)    Facial numbness (10/31/2021)    Hospital Course:     Ms. Donnie Hernández is a 35 y.o. admitted to [facility] and treated for the following:    Cervical spondylosis with radiculopathy (2021) / Left sided numbness (10/31/2021) / Neck pain (10/31/2021) /  Facial numbness (10/31/2021) / History of transient ischemic attack (TIA) (10/31/2021) POA: apparently had a TIA in 2021. During this admission, a CT scan code neuro, CTA head and neck unremarkable. MRi brain neg for CVA. MRi neck showed a mild spondylosis C4-5 C5-6. Seen by neurology who recommended muscle relaxants and NSAIDs. Follow up as needed       Chest pain (10/31/2021) POA: associated with SOB. She says she had a complete work up including holter monitoring around 2018 in [facility]. Serial troponin neg. Echo showed a normal EF. Seen by cardiology and had a normal stress test. Continue Asprin, Lipitor     HTN (hypertension), benign (10/31/2021) POA: BP stable. Resume Lisinopril      Hyperkalemia (10/31/2021) POA: due to hemolysis.  Repeat was normal.     Discharge Exam:    Visit Vitals  /82   Pulse 72   Temp 98.2 °F (36.8 °C)   Resp 20   Ht 5' 8\" (1.727 m)   Wt 84.4 kg (186 lb)   SpO2 99%   BMI 28.28 kg/m²        Patient has been seen and examined. General: well looking and in no acute distress  Pulm: clear breath sounds without wheezes  Card: no murmurs, normal S1, S2 without thrills, bruits   Abd:    soft, non-tender, normoactive bowel sounds  Skin: no rashes and skin turgor is good  Neuro: awake, alert and has a non focal     Activity: Activity as tolerated    Diet: Regular Diet    Current Discharge Medication List      START taking these medications    Details   aspirin 81 mg chewable tablet Take 1 Tablet by mouth daily for 30 days. Qty: 30 Tablet, Refills: 1      atorvastatin (LIPITOR) 20 mg tablet Take 1 Tablet by mouth daily for 30 days. Qty: 30 Tablet, Refills: 1      cyclobenzaprine (FLEXERIL) 10 mg tablet Take 1 Tablet by mouth two (2) times a day for 14 days. Qty: 28 Tablet, Refills: 0             Follow-up Information     Follow up With Specialties Details Why Contact Info    None    None ([phone]) Patient stated that they have no PCP      Mary Jo Mayorga MD Neurology Call to schedule a neurology follow up as needed [address] [place] [address] [phone]            Follow-up tests or labs: None    Discharge Condition: Stable    Disposition: home    Time taken to arrange discharge:  35 minutes.     Signed:  Jacque Sampson MD     [facility]  11/1/2021   4:50 PM

## 2021-11-01 NOTE — PROGRESS NOTES
Problem: Mobility Impaired (Adult and Pediatric)  Goal: *Acute Goals and Plan of Care (Insert Text)  Outcome: Progressing Towards Goal   PHYSICAL THERAPY EVALUATION/DISCHARGE  Patient: Gary Avila (78 y.o. female)  Date: 11/1/2021  Primary Diagnosis: Left sided numbness [R20.0]        Precautions:    (BEFAST)      ASSESSMENT  Based on the objective data described below, the patient presents with admission due to L sided facial/UE numbness. pt with hx of TIA 1 mo ago. CT and MRI negative with this episode. Pt received sitting on EOB requesting to go to the bathroom. Pt educated with [facility] protocol and verbalizing good understanding. SBA to Independent with gait of l50' in room. Scored 56/56 on [employer].  Toileted independently and left in NAD in recliner. Functional Outcome Measure: The patient scored 56/56 on the Sena outcome measure which is indicative of no fall risk. Other factors to consider for discharge: per above     Further skilled acute physical therapy is not indicated at this time. PLAN :  Recommendation for discharge: (in order for the patient to meet his/her long term goals)  No skilled physical therapy/ follow up rehabilitation needs identified at this time. This discharge recommendation:  Has not yet been discussed the attending provider and/or case management    IF patient discharges home will need the following DME: none       SUBJECTIVE:   Patient stated Pb Odonnell had a TIA last month.     OBJECTIVE DATA SUMMARY:   HISTORY:    Past Medical History:   Diagnosis Date    HTN (hypertension), benign    No past surgical history on file.     Prior level of function: Independent  Personal factors and/or comorbidities impacting plan of care: per above and below    Home Situation  Home Environment: Private residence  # Steps to Enter: 6  Rails to Enter: Yes  One/Two Story Residence: One story  Living Alone: No  Support Systems: Spouse/Significant Other, Child(alexandra)  Patient Expects to be Discharged to[de-identified] House  Current DME Used/Available at Home: None  Tub or Shower Type: Shower    EXAMINATION/PRESENTATION/DECISION MAKING:   Critical Behavior:  Neurologic State: Alert  Orientation Level: Oriented X4  Cognition: Appropriate decision making, Appropriate for age attention/concentration, Appropriate safety awareness, Follows commands  Safety/Judgement: Awareness of environment, Fall prevention, Good awareness of safety precautions, Home safety, Insight into deficits  Hearing: Auditory  Auditory Impairment: None  Skin:  IV  Edema: WNL  Range Of Motion:  AROM: Within functional limits                       Strength:    Strength:  Within functional limits                    Tone & Sensation:   Tone: Normal                              Coordination:  Coordination: Within functional limits  Vision:   Tracking: Able to track stimulus in all quadrants w/o difficulty  Visual Fields:  (WFLs)  Diplopia: No  Acuity: Within Defined Limits  Functional Mobility:  Bed Mobility:  Rolling: Independent  Supine to Sit: Independent  Sit to Supine: Independent  Scooting: Independent  Transfers:  Sit to Stand: Independent  Stand to Sit: Independent                       Balance:   Sitting: Intact  Standing: Intact  Ambulation/Gait Training:  Distance (ft): 150 Feet (ft)  Assistive Device: Gait belt                                              Functional Measure:  Sena Balance Test:    Sitting to Standin  Standing Unsupported: 4  Sitting with Back Unsupported: 4  Standing to Sittin  Transfers: 4  Standing Unsupported with Eyes Closed: 4  Standing Unsupported with Feet Together: 4  Reach Forward with Outstretched Arm: 4   Object: 4  Turn to Look Over Shoulders: 4  Turn 360 Degrees: 4  Alternate Foot on Step/Stool: 4  Standing Unsupported One Foot in Front: 4  Stand on One Le  Total: 56/56         56=Maximum possible score;   0-20=High fall risk  21-40=Moderate fall risk 41-56=Low fall risk               Physical Therapy Evaluation Charge Determination   History Examination Presentation Decision-Making   MEDIUM  Complexity : 1-2 comorbidities / personal factors will impact the outcome/ POC  LOW Complexity : 1-2 Standardized tests and measures addressing body structure, function, activity limitation and / or participation in recreation  LOW Complexity : Stable, uncomplicated  Other outcome measures barthel  LOW       Based on the above components, the patient evaluation is determined to be of the following complexity level: LOW     Pain Rating:  none    Activity Tolerance:   Good      After treatment patient left in no apparent distress:   Sitting in chair and Call bell within reach and alarm set    COMMUNICATION/EDUCATION:   The patients plan of care was discussed with: Occupational therapist and Registered nurse. Fall prevention education was provided and the patient/caregiver indicated understanding., Patient/family have participated as able in goal setting and plan of care. , and Patient/family agree to work toward stated goals and plan of care.     Thank you for this referral.  Santos Watson, PT   Time Calculation: 23 mins

## 2021-11-01 NOTE — CONSULTS
NEUROLOGY CONSULT NOTE    Patient ID:  Marcella Souza  [id number]  46 y.o.  1980    Date of Consultation:  November 1, 2021    Referring Physician: Dr. Stewart Berger    Reason for Consultation:  Numbness and weakness    History of Present Illness:     Patient Active Problem List    Diagnosis Date Noted    Left sided numbness 10/31/2021    Neck pain 10/31/2021    Hyperkalemia 10/31/2021    History of transient ischemic attack (TIA) 10/31/2021    Chest pain 10/31/2021    HTN (hypertension), benign 10/31/2021    Facial numbness 10/31/2021     Past Medical History:   Diagnosis Date    HTN (hypertension), benign       No past surgical history on file. Prior to Admission medications    Not on File     No Known Allergies   Social History     Tobacco Use    Smoking status: Never Smoker   Substance Use Topics    Alcohol use: Not on file      Family History   Problem Relation Age of Onset    Stroke Neg Hx         Subjective:      Marcella Souza is a 35 y.o. with history of hypertension and TIA (September 2021) was admitted from the ER for left arm weakness and left facial numbness. Patient woke up at 2 in the morning and noted that her left arm was numb and tingly and was weaker than usual.  Also has some neck tightness as well as some mild chest discomfort prior to sleeping. Patient went to the emergency room. In the ER blood pressure was 146/87. NIH stroke scale was 5. Labs revealed decreased hemoglobin at 10.6, decreased hematocrit, MCV, MCH and elevated platelet at 808. LDL is 99.2. Unremarkable CMP, troponin. Slightly elevated hemoglobin A1c at 5.8 and D-dimer at 0.67. Head CT without contrast did not reveal any acute process. CT perfusion study did not reveal any abnormality. Head and neck CTA did not reveal any flow-limiting stenosis or aneurysm. No ICA occlusion. Chest x-ray was negative.   Brain MRI with and without contrast was essentially normal.  No abnormal enhancement. Cervical MRI with and without contrast did not reveal any abnormal enhancement. No cord lesions. Some straightening of the normal lordosis compatible with muscle spasm. Mild spondylosis and C5-6 showing mild foraminal compromise by spur. When seen, patient just reports occiput and neck pain. Outside reports reviewed: ER records, radiology reports, lab reports. Review of Systems:    Pertinent items are noted in HPI. Objective:     Patient Vitals for the past 8 hrs:   BP Temp Pulse Resp SpO2 Weight   11/01/21 0809 (!) 110/55 98.2 °F (36.8 °C) 63 20 100 %    11/01/21 0800   63      11/01/21 0700   61      11/01/21 0431 105/65 98.4 °F (36.9 °C) 69 15 100 % 89.5 kg (197 lb 5 oz)     PHYSICAL EXAM:    NEUROLOGICAL EXAM:    Appearance: The patient is well developed, well nourished, provides a coherent history and is in no acute distress. Mental Status: Oriented to time, place and person. Fluent, no aphasia or dysarthria. Mood and affect appropriate. Cranial Nerves:   Intact visual fields. FLORENTINO, EOM's full, no nystagmus, no ptosis. Facial sensation is normal. Corneal reflexes are intact. Facial movement is symmetric. Hearing is normal bilaterally. Palate is midline with normal elevation. Sternocleidomastoid and trapezius muscles are normal. Tongue is midline. Motor:  5/5 strength in upper and lower proximal and distal muscles. Normal bulk and tone. No fasciculations. No pronator drift. Reflexes:   Deep tendon reflexes 1+/4 and symmetrical. Downgoing toes. Sensory:   Normal to cold and vibration. Gait:  Not tested. Tremor:   No tremor noted. Cerebellar:  Intact FTN/BRODIE/HTS.        Imaging  CT Head,, head/neck CTA, brain MRI, cervical MRI: reviewed    Lab Review    Recent Results (from the past 24 hour(s))   GLUCOSE, POC    Collection Time: 10/31/21  4:54 PM   Result Value Ref Range    Glucose (POC) 95 65 - 117 mg/dL    Performed by FANNY JENNINGS    CBC WITH AUTOMATED DIFF    Collection Time: 10/31/21  5:25 PM   Result Value Ref Range    WBC 7.4 3.6 - 11.0 K/uL    RBC 5.12 3.80 - 5.20 M/uL    HGB 10.6 (L) 11.5 - 16.0 g/dL    HCT 33.1 (L) 35.0 - 47.0 %    MCV 64.6 (L) 80.0 - 99.0 FL    MCH 20.7 (L) 26.0 - 34.0 PG    MCHC 32.0 30.0 - 36.5 g/dL    RDW 16.6 (H) 11.5 - 14.5 %    PLATELET 620 (H) 598 - 400 K/uL    MPV 10.7 8.9 - 12.9 FL    NRBC 0.0 0  WBC    ABSOLUTE NRBC 0.00 0.00 - 0.01 K/uL    NEUTROPHILS 58 32 - 75 %    LYMPHOCYTES 32 12 - 49 %    MONOCYTES 8 5 - 13 %    EOSINOPHILS 1 0 - 7 %    BASOPHILS 1 0 - 1 %    IMMATURE GRANULOCYTES 0 0.0 - 0.5 %    ABS. NEUTROPHILS 4.2 1.8 - 8.0 K/UL    ABS. LYMPHOCYTES 2.4 0.8 - 3.5 K/UL    ABS. MONOCYTES 0.6 0.0 - 1.0 K/UL    ABS. EOSINOPHILS 0.1 0.0 - 0.4 K/UL    ABS. BASOPHILS 0.1 0.0 - 0.1 K/UL    ABS. IMM. GRANS. 0.0 0.00 - 0.04 K/UL    DF AUTOMATED      PLATELET COMMENTS Large Platelets      RBC COMMENTS HYPOCHROMIA  2+        RBC COMMENTS MICROCYTOSIS  2+        RBC COMMENTS ANISOCYTOSIS  1+       METABOLIC PANEL, COMPREHENSIVE    Collection Time: 10/31/21  5:25 PM   Result Value Ref Range    Sodium 136 136 - 145 mmol/L    Potassium 7.0 (HH) 3.5 - 5.1 mmol/L    Chloride 107 97 - 108 mmol/L    CO2 23 21 - 32 mmol/L    Anion gap 6 5 - 15 mmol/L    Glucose 86 65 - 100 mg/dL    BUN 15 6 - 20 MG/DL    Creatinine 1.03 (H) 0.55 - 1.02 MG/DL    BUN/Creatinine ratio 15 12 - 20      GFR est AA >60 >60 ml/min/1.73m2    GFR est non-AA 58 (L) >60 ml/min/1.73m2    Calcium 8.8 8.5 - 10.1 MG/DL    Bilirubin, total 1.0 0.2 - 1.0 MG/DL    ALT (SGPT) 25 12 - 78 U/L    AST (SGOT) 64 (H) 15 - 37 U/L    Alk.  phosphatase 82 45 - 117 U/L    Protein, total 8.1 6.4 - 8.2 g/dL    Albumin 3.4 (L) 3.5 - 5.0 g/dL    Globulin 4.7 (H) 2.0 - 4.0 g/dL    A-G Ratio 0.7 (L) 1.1 - 2.2     TROPONIN-HIGH SENSITIVITY    Collection Time: 10/31/21  5:25 PM   Result Value Ref Range    Troponin-High Sensitivity 7 0 - 51 ng/L   PROTHROMBIN TIME + INR    Collection Time: 10/31/21  6:00 PM   Result Value Ref Range    INR 1.0 0.9 - 1.1      Prothrombin time 10.6 9.0 - 11.1 sec   PTT    Collection Time: 10/31/21  6:00 PM   Result Value Ref Range    aPTT 23.6 22.1 - 31.0 sec    aPTT, therapeutic range     58.0 - 77.0 SECS   D DIMER    Collection Time: 10/31/21  6:00 PM   Result Value Ref Range    D-dimer 0.67 (H) 0.00 - 0.65 mg/L FEU   METABOLIC PANEL, BASIC    Collection Time: 10/31/21  7:24 PM   Result Value Ref Range    Sodium 140 136 - 145 mmol/L    Potassium 3.5 3.5 - 5.1 mmol/L    Chloride 108 97 - 108 mmol/L    CO2 24 21 - 32 mmol/L    Anion gap 8 5 - 15 mmol/L    Glucose 90 65 - 100 mg/dL    BUN 15 6 - 20 MG/DL    Creatinine 1.02 0.55 - 1.02 MG/DL    BUN/Creatinine ratio 15 12 - 20      GFR est AA >60 >60 ml/min/1.73m2    GFR est non-AA 59 (L) >60 ml/min/1.73m2    Calcium 8.5 8.5 - 10.1 MG/DL   TROPONIN-HIGH SENSITIVITY    Collection Time: 11/01/21  1:31 AM   Result Value Ref Range    Troponin-High Sensitivity 8 0 - 51 ng/L   LIPID PANEL    Collection Time: 11/01/21  1:31 AM   Result Value Ref Range    Cholesterol, total 174 <200 MG/DL    Triglyceride 114 <150 MG/DL    HDL Cholesterol 52 MG/DL    LDL, calculated 99.2 0 - 100 MG/DL    VLDL, calculated 22.8 MG/DL    CHOL/HDL Ratio 3.3 0.0 - 5.0     HEMOGLOBIN A1C WITH EAG    Collection Time: 11/01/21  1:31 AM   Result Value Ref Range    Hemoglobin A1c 5.8 (H) 4.0 - 5.6 %    Est. average glucose 120 mg/dL         Assessment:   Cervical spondylosis with radiculopathy  History of TIA  Hyperlipidemia    Plan:   Neurological examination is nonfocal.  No residual deficits. Event is more consistent with cervical spondylosis with radiculopathy. Head CT without contrast did not reveal any acute process. CT perfusion study was negative. Brain MRI with and without contrast was essentially normal by my review. Head and neck CTA did not reveal any flow-limiting stenosis or aneurysm. No ICA stenosis.     Cervical MRI with and without contrast did not reveal any abnormal enhancements or cord lesions. Straightening of the normal lordosis compatible with muscle spasm. Mild spondylotic changes and C5-6 showing mild foraminal compromise due to spurring. Recommend trial of muscle relaxant daily for 1 to 2 weeks. As needed nonsteroidals. Patient with prior history of TIA which involved her legs. No recurrence of that. Continue aspirin for stroke prophylaxis. Given elevated LDL which should be less than 70 patient started on atorvastatin 20 mg daily. Patient was advised per DMV regulation she cannot drive for 3 months from her TIA. She understood. No further recommendations from a neurological standpoint. Thank you for the consult. This note was created using voice recognition software. Despite editing, there may be syntax errors.

## 2021-11-01 NOTE — PROGRESS NOTES
Discharge instructions, including information on new medications and stroke, were reviewed with patient. All questions were answered. IV sites x 2 and telemetry monitoring were removed. Patient will contact PCP for follow up appointment. Patient will be discharged home with her . Primary nurse updated.

## 2021-11-01 NOTE — DISCHARGE INSTRUCTIONS
HOSPITALIST DISCHARGE INSTRUCTIONS    NAME: Jillian Napier   :  1980   MRN:  [id number]     Date:     2021    ADMIT DATE: 10/31/2021     DISCHARGE DATE: 2021     PRINCIPAL ADMITTING DIAGNOSIS:  Left sided numbness [R20.0]    DISCHARGE DIAGNOSES:  Principal Problem:    Cervical spondylosis with radiculopathy (2021)    Left sided numbness (10/31/2021)    Neck pain (10/31/2021)    History of transient ischemic attack (TIA) (10/31/2021)    Chest pain (10/31/2021)    Facial numbness (10/31/2021)    MEDICATIONS:    · It is important that medications are taken exactly as they are prescribed on the discharge medication instructions and keep them your  in the bottles provided by the pharmacist.   · Keep a list of the medication names, dosages, and times to be taken at all times. · Do not take other medications without consulting your doctor. Recommended diet:  Regular Diet / low cholesterol    Recommended activity: Activity as tolerated    Post discharge care:    Notify follow up health care provider or return to the emergency department if you cannot get hold of your doctor if you feel worse or experience symptoms similar to those that brought you to hospital    Follow-up Information     Follow up With Specialties Details Why Contact Info    None    None ([phone]) Patient stated that they have no PCP      Abraham Limon MD Neurology Call to schedule a neurology follow up as needed [address] [phone]            Information obtained by :  I understand that if any problems occur once I am at home I am to contact my physician and I understand and acknowledge receipt of the instructions indicated above.                                                                                                                                            Physician's or R.N.'s Signature Date/Time                                                                                                                                              Patient or Representative Signature                                                          Date/Time                            Tiigi [address] November 1, 2021       RE: Elizabeth Velez      To Whom It May Concern: This is to certify that Elizabeth Velez was hospitalized at [address] [place] [address] from 10/31/2021 to 11/1/2021 and may return to work in 3 days. Please feel free to contact my office at [phone] if you have any questions or concerns. Thank you for your assistance in this matter.       Sincerely,  Piteer Lynne MD

## 2021-11-01 NOTE — PROGRESS NOTES
Reason for Admission:  Left sided numbness    Past Medical History:   Diagnosis Date    HTN (hypertension), benign      COVID19 Vaccine:  yes       type: Moderna     Date completed: 5/2/21. RUR Score:       observation              Plan for utilizing home health:   None, DME - walker        PCP: First and Last name:  Dr. Jamel Trinidad     Name of Practice:    Are you a current patient: Yes/No:  yes   Approximate date of last visit:   2 months ago   Can you participate in a virtual visit with your PCP:   yes                    Current Advanced Directive/Advance Care Plan: Full Code    Healthcare Decision Maker:   Click here to complete [address] including selection of the Healthcare Decision Maker Relationship (ie \"Primary\")            Lissette Miller - [phone]                  Transition of Care Plan:        Chart reviewed, demographics verified. CM role and follow up discussed. Met with patient at bedside, face mask on. Patient lives with husbnad, daughter, and son. Patient lives in a one story home with 6 steps to enter home. Patient has prescription drug coverage, uses [employer]. Patient is independent, drives, and provides self care. Patient performs ADLs independently. Current status:  Patient currently requiring medical management including ongoing assessment and monitoring. PT/OT/speech - no follow up needs identified. PLAN:  1. Monitor patient response to treatment and recommendations. 2. Medical management continues. 3. Patient home with family at [place]. 4. Patient transport home per  at discharge. 5. CM to monitor clinical progress and disposition recommendations. Care Management Interventions  PCP Verified by CM: Yes (Dr. Jamel Trinidad)  Mode of Transport at Discharge:  Other (see comment) (per )  Transition of Care Consult (CM Consult): Discharge Planning  Physical Therapy Consult: Yes  Occupational Therapy Consult: Yes  Speech Therapy Consult: Yes  Support Systems: Spouse/Significant Other, Child(alexandra)  Confirm Follow Up Transport: Family  The Plan for Transition of Care is Related to the Following Treatment Goals : return home at [facility]  Discharge Location  Discharge Placement: Home with family assistance    Viraj Muñoz, RN, MSN, Care manager

## 2021-11-01 NOTE — PROGRESS NOTES
Bedside and Verbal shift change report given to JUAN DANIEL RN (oncoming nurse) by [facility] RN (offgoing nurse). Report included the following information SBAR, Kardex, Intake/Output, MAR, Accordion and Recent Results. This patient was assisted with Intentional Toileting every 2 hours during this shift as appropriate. Documentation of ambulation and output reflected on Flowsheet as appropriate. Purposeful hourly rounding was completed using AIDET and 5Ps. Outcomes of PHR documented as they occurred. Bed alarm in use as appropriate. Dual Suction and ambubag in place. Stroke Education provided to patient and the following topics were discussed    1. Patients personal risk factors for stroke are hypertension and prior stroke    2. Warning signs of Stroke:        * Sudden numbness or weakness of the face, arm or leg, especially on one side of          The body            * Sudden confusion, trouble speaking or understanding        * Sudden trouble seeing in one or both eyes        * Sudden trouble walking, dizziness, loss of balance or coordination        * Sudden severe headache with no known cause      3. Importance of activation Emergency Medical Services ( 9-1-1 ) immediately if experience any warning signs of stroke. 4. Be sure and schedule a follow-up appointment with your primary care doctor or any specialists as instructed. 5. You must take medicine every day to treat your risk factors for stroke. Be sure to take your medicines exactly as your doctor tells you: no more, no less. Know what your medicines are for , what they do. Anti-thrombotics /anticoagulants can help prevent strokes. You are taking the following medicine(s)  Aspirin, lipitor    6. Smoking and second-hand smoke greatly increase your risk of stroke, cardiovascular disease and death. Smoking history never    7. Information provided was [facility] Stroke Education Binder    8.  Documentation of teaching completed in Patient Education Activity and on Care Plan with teaching response noted? yes    I have reviewed discharge instructions with the patient. The patient verbalized understanding.

## 2021-11-01 NOTE — CONSULTS
Fabiana Smith MD. [facility]              Patient: Pancho Can  : 1980      Today's Date: 2021          HISTORY OF PRESENT ILLNESS:     History of Present Illness:  Reason for consult: chest pain     Ms. Loreto Gonzalez is a 35 y.o. female who is being admitted for left sided numbness. She says she went to bed with some neck pain - woke up at 2 AM with neck pain radiating down to arm and then radiating to her chest.  She denies any problems of heart disease of exertional chest pain. She feels fine this AM without chest pain. Her EKG and hs-trop were normal.           PAST MEDICAL HISTORY:     Past Medical History:   Diagnosis Date    HTN (hypertension), benign          MEDICATIONS:     Current Facility-Administered Medications   Medication Dose Route Frequency Provider Last Rate Last Admin    acetaminophen (TYLENOL) tablet 650 mg  650 mg Oral Q4H PRN Ciarra Cali MD        Or    acetaminophen (TYLENOL) solution 650 mg  650 mg Per NG tube Q4H PRN Ciarra Cali MD        Or    acetaminophen (TYLENOL) suppository 650 mg  650 mg Rectal Q4H PRN Ciarra Cali MD        0.9% sodium chloride infusion  75 mL/hr IntraVENous CONTINUOUS Ciarra Cali MD 75 mL/hr at 10/31/21 2055 75 mL/hr at 10/31/21 2055    aspirin chewable tablet 81 mg  81 mg Oral DAILY Ciarra Cali MD   81 mg at 21 0850       No Known Allergies        SOCIAL HISTORY:     Social History     Tobacco Use    Smoking status: Never Smoker   Substance Use Topics    Alcohol use: Not Currently    Drug use: Not on file         FAMILY HISTORY:     Family History   Problem Relation Age of Onset    Clotting Disorder Mother     Stroke Neg Hx            REVIEW OF SYMPTOMS:     Review of Symptoms:  Constitutional: Negative for fever, chills  HEENT: Negative for nosebleeds, tinnitus, and vision changes.    Respiratory: Negative for cough, wheezing  Cardiovascular: Negative for orthopnea, claudication, syncope, and PND. Gastrointestinal: Negative for abdominal pain, diarrhea, melena. Genitourinary: Negative for dysuria  Musculoskeletal: Negative for myalgias. Skin: Negative for rash  Heme: No problems bleeding. Neurological: Negative for speech change and focal weakness. PHYSICAL EXAM:     Physical Exam:  Visit Vitals  BP (!) 110/55 (BP 1 Location: Right upper arm, BP Patient Position: At rest)   Pulse 63   Temp 98.2 °F (36.8 °C)   Resp 20   Ht 5' 8\" (1.727 m)   Wt 197 lb 5 oz (89.5 kg)   SpO2 100%   BMI 30.00 kg/m²     Patient appears generally well, mood and affect are appropriate and pleasant. HEENT:  Hearing intact, non-icteric, normocephalic, atraumatic. Neck Exam: Supple   Lung Exam: Clear to auscultation, even breath sounds. Cardiac Exam: Regular rate and rhythm with no murmur or rub  Abdomen: Soft, non-tender,   Extremities: Moves all ext well. No lower extremity edema. MSKTL: Overall good ROM ext  Skin: No significant rashes  Psych: Appropriate affect  Neuro - Grossly intact      LABS / OTHER STUDIES reviewed:     Recent Results (from the past 24 hour(s))   GLUCOSE, POC    Collection Time: 10/31/21  4:54 PM   Result Value Ref Range    Glucose (POC) 95 65 - 117 mg/dL    Performed by FANNY JENNINGS    CBC WITH AUTOMATED DIFF    Collection Time: 10/31/21  5:25 PM   Result Value Ref Range    WBC 7.4 3.6 - 11.0 K/uL    RBC 5.12 3.80 - 5.20 M/uL    HGB 10.6 (L) 11.5 - 16.0 g/dL    HCT 33.1 (L) 35.0 - 47.0 %    MCV 64.6 (L) 80.0 - 99.0 FL    MCH 20.7 (L) 26.0 - 34.0 PG    MCHC 32.0 30.0 - 36.5 g/dL    RDW 16.6 (H) 11.5 - 14.5 %    PLATELET 202 (H) 691 - 400 K/uL    MPV 10.7 8.9 - 12.9 FL    NRBC 0.0 0  WBC    ABSOLUTE NRBC 0.00 0.00 - 0.01 K/uL    NEUTROPHILS 58 32 - 75 %    LYMPHOCYTES 32 12 - 49 %    MONOCYTES 8 5 - 13 %    EOSINOPHILS 1 0 - 7 %    BASOPHILS 1 0 - 1 %    IMMATURE GRANULOCYTES 0 0.0 - 0.5 %    ABS. NEUTROPHILS 4.2 1.8 - 8.0 K/UL    ABS.  LYMPHOCYTES 2.4 0.8 - 3.5 K/UL ABS. MONOCYTES 0.6 0.0 - 1.0 K/UL    ABS. EOSINOPHILS 0.1 0.0 - 0.4 K/UL    ABS. BASOPHILS 0.1 0.0 - 0.1 K/UL    ABS. IMM. GRANS. 0.0 0.00 - 0.04 K/UL    DF AUTOMATED      PLATELET COMMENTS Large Platelets      RBC COMMENTS HYPOCHROMIA  2+        RBC COMMENTS MICROCYTOSIS  2+        RBC COMMENTS ANISOCYTOSIS  1+       METABOLIC PANEL, COMPREHENSIVE    Collection Time: 10/31/21  5:25 PM   Result Value Ref Range    Sodium 136 136 - 145 mmol/L    Potassium 7.0 (HH) 3.5 - 5.1 mmol/L    Chloride 107 97 - 108 mmol/L    CO2 23 21 - 32 mmol/L    Anion gap 6 5 - 15 mmol/L    Glucose 86 65 - 100 mg/dL    BUN 15 6 - 20 MG/DL    Creatinine 1.03 (H) 0.55 - 1.02 MG/DL    BUN/Creatinine ratio 15 12 - 20      GFR est AA >60 >60 ml/min/1.73m2    GFR est non-AA 58 (L) >60 ml/min/1.73m2    Calcium 8.8 8.5 - 10.1 MG/DL    Bilirubin, total 1.0 0.2 - 1.0 MG/DL    ALT (SGPT) 25 12 - 78 U/L    AST (SGOT) 64 (H) 15 - 37 U/L    Alk.  phosphatase 82 45 - 117 U/L    Protein, total 8.1 6.4 - 8.2 g/dL    Albumin 3.4 (L) 3.5 - 5.0 g/dL    Globulin 4.7 (H) 2.0 - 4.0 g/dL    A-G Ratio 0.7 (L) 1.1 - 2.2     TROPONIN-HIGH SENSITIVITY    Collection Time: 10/31/21  5:25 PM   Result Value Ref Range    Troponin-High Sensitivity 7 0 - 51 ng/L   PROTHROMBIN TIME + INR    Collection Time: 10/31/21  6:00 PM   Result Value Ref Range    INR 1.0 0.9 - 1.1      Prothrombin time 10.6 9.0 - 11.1 sec   PTT    Collection Time: 10/31/21  6:00 PM   Result Value Ref Range    aPTT 23.6 22.1 - 31.0 sec    aPTT, therapeutic range     58.0 - 77.0 SECS   D DIMER    Collection Time: 10/31/21  6:00 PM   Result Value Ref Range    D-dimer 0.67 (H) 0.00 - 0.65 mg/L FEU   METABOLIC PANEL, BASIC    Collection Time: 10/31/21  7:24 PM   Result Value Ref Range    Sodium 140 136 - 145 mmol/L    Potassium 3.5 3.5 - 5.1 mmol/L    Chloride 108 97 - 108 mmol/L    CO2 24 21 - 32 mmol/L    Anion gap 8 5 - 15 mmol/L    Glucose 90 65 - 100 mg/dL    BUN 15 6 - 20 MG/DL    Creatinine 1.02 0.55 - 1.02 MG/DL    BUN/Creatinine ratio 15 12 - 20      GFR est AA >60 >60 ml/min/1.73m2    GFR est non-AA 59 (L) >60 ml/min/1.73m2    Calcium 8.5 8.5 - 10.1 MG/DL   TROPONIN-HIGH SENSITIVITY    Collection Time: 11/01/21  1:31 AM   Result Value Ref Range    Troponin-High Sensitivity 8 0 - 51 ng/L   LIPID PANEL    Collection Time: 11/01/21  1:31 AM   Result Value Ref Range    Cholesterol, total 174 <200 MG/DL    Triglyceride 114 <150 MG/DL    HDL Cholesterol 52 MG/DL    LDL, calculated 99.2 0 - 100 MG/DL    VLDL, calculated 22.8 MG/DL    CHOL/HDL Ratio 3.3 0.0 - 5.0     HEMOGLOBIN A1C WITH EAG    Collection Time: 11/01/21  1:31 AM   Result Value Ref Range    Hemoglobin A1c 5.8 (H) 4.0 - 5.6 %    Est. average glucose 120 mg/dL         CARDIAC DIAGNOSTICS:     Cardiac Evaluation Includes:  I reviewed the results below. CTA head neck 10/31/31 - no sig vascular abn   Brain MRI 10/31/21  - normal     EKG 10/31/21 - NSR, normal       ASSESSMENT AND PLAN:     Assessment and Plan:    1) Non-cardiac CP   - her chest pain sounds non-cardiac (seems to be related to her neck pain and radiation from that)   - Her hs trop and EKG were normal   - Will check a treadmill stress test to rule out high risk findings (Neurology is OK with us performing the stress test today)     2) Left sided numbness / tingling   - brain MRI normal   - Neurology evaluating         Lucero Kulkarni MD, [address], [address]  [address] [facility].  [address] [facility] [address], [address]  Ph: [phone]   Ph [phone]

## 2022-03-18 PROBLEM — M47.22 CERVICAL SPONDYLOSIS WITH RADICULOPATHY: Status: ACTIVE | Noted: 2021-11-01

## 2022-03-18 PROBLEM — M54.2 NECK PAIN: Status: ACTIVE | Noted: 2021-10-31

## 2022-03-18 PROBLEM — R20.0 FACIAL NUMBNESS: Status: ACTIVE | Noted: 2021-10-31

## 2022-03-19 PROBLEM — R07.9 CHEST PAIN: Status: ACTIVE | Noted: 2021-10-31

## 2022-03-19 PROBLEM — R20.0 LEFT SIDED NUMBNESS: Status: ACTIVE | Noted: 2021-10-31

## 2022-03-19 PROBLEM — Z86.73 HISTORY OF TRANSIENT ISCHEMIC ATTACK (TIA): Status: ACTIVE | Noted: 2021-10-31

## 2022-12-30 ENCOUNTER — HOSPITAL ENCOUNTER (EMERGENCY)
Age: 42
Discharge: HOME | End: 2022-12-30
Attending: STUDENT IN AN ORGANIZED HEALTH CARE EDUCATION/TRAINING PROGRAM
Payer: COMMERCIAL

## 2022-12-30 VITALS
TEMPERATURE: 97.6 F | HEART RATE: 101 BPM | SYSTOLIC BLOOD PRESSURE: 134 MMHG | OXYGEN SATURATION: 100 % | DIASTOLIC BLOOD PRESSURE: 88 MMHG | RESPIRATION RATE: 16 BRPM

## 2022-12-30 DIAGNOSIS — R07.9 ACUTE CHEST PAIN: ICD-10-CM

## 2022-12-30 DIAGNOSIS — D64.9 ANEMIA, UNSPECIFIED TYPE: ICD-10-CM

## 2022-12-30 DIAGNOSIS — G56.22 ULNAR NEUROPATHY OF LEFT UPPER EXTREMITY: ICD-10-CM

## 2022-12-30 DIAGNOSIS — M54.12 CERVICAL RADICULOPATHY: Primary | ICD-10-CM

## 2022-12-30 LAB
ALBUMIN SERPL-MCNC: 4.3 G/DL (ref 3.5–5.2)
ALBUMIN/GLOB SERPL: 1.2 {RATIO} (ref 1.1–2.2)
ALP SERPL-CCNC: 80 U/L (ref 35–104)
ALT SERPL-CCNC: 17 U/L (ref 10–35)
ANION GAP SERPL CALC-SCNC: 15 MMOL/L (ref 5–15)
AST SERPL-CCNC: 19 U/L (ref 10–35)
ATRIAL RATE: 98 BPM
BASOPHILS # BLD: 0.1 K/UL (ref 0–0.1)
BASOPHILS NFR BLD: 1 % (ref 0–1)
BILIRUB SERPL-MCNC: 0.5 MG/DL (ref 0.2–1)
BUN SERPL-MCNC: 15 MG/DL (ref 6–20)
BUN/CREAT SERPL: 12 (ref 12–20)
CALCIUM SERPL-MCNC: 9.5 MG/DL (ref 8.6–10)
CALCULATED P AXIS, ECG09: 48 DEGREES
CALCULATED R AXIS, ECG10: 41 DEGREES
CALCULATED T AXIS, ECG11: 50 DEGREES
CHLORIDE SERPL-SCNC: 103 MMOL/L (ref 98–107)
CO2 SERPL-SCNC: 25 MMOL/L (ref 22–29)
CREAT SERPL-MCNC: 1.22 MG/DL (ref 0.5–0.9)
DIAGNOSIS, 93000: NORMAL
DIFFERENTIAL METHOD BLD: ABNORMAL
EOSINOPHIL # BLD: 0 K/UL (ref 0–0.4)
EOSINOPHIL NFR BLD: 0 % (ref 0–7)
ERYTHROCYTE [DISTWIDTH] IN BLOOD BY AUTOMATED COUNT: 18.2 % (ref 11.5–14.5)
GLOBULIN SER CALC-MCNC: 3.7 G/DL (ref 2–4)
GLUCOSE SERPL-MCNC: 100 MG/DL (ref 65–100)
HCT VFR BLD AUTO: 28.7 % (ref 35–47)
HGB BLD-MCNC: 8.8 G/DL (ref 11.5–16)
IMM GRANULOCYTES # BLD AUTO: 0.1 K/UL (ref 0–0.04)
IMM GRANULOCYTES NFR BLD AUTO: 1 % (ref 0–0.5)
LYMPHOCYTES # BLD: 2.1 K/UL (ref 0.8–3.5)
LYMPHOCYTES NFR BLD: 21 % (ref 12–49)
MAGNESIUM SERPL-MCNC: 2.3 MG/DL (ref 1.6–2.6)
MCH RBC QN AUTO: 18.6 PG (ref 26–34)
MCHC RBC AUTO-ENTMCNC: 30.7 G/DL (ref 30–36.5)
MCV RBC AUTO: 60.8 FL (ref 80–99)
MONOCYTES # BLD: 0.5 K/UL (ref 0–1)
MONOCYTES NFR BLD: 5 % (ref 5–13)
NEUTS SEG # BLD: 7.1 K/UL (ref 1.8–8)
NEUTS SEG NFR BLD: 72 % (ref 32–75)
NRBC # BLD: 0.02 K/UL (ref 0–0.01)
NRBC BLD-RTO: 0.2 PER 100 WBC
P-R INTERVAL, ECG05: 148 MS
PLATELET # BLD AUTO: 542 K/UL (ref 150–400)
PMV BLD AUTO: 10.1 FL (ref 8.9–12.9)
POTASSIUM SERPL-SCNC: 3.7 MMOL/L (ref 3.5–5.1)
PROT SERPL-MCNC: 8 G/DL (ref 6.4–8.3)
Q-T INTERVAL, ECG07: 364 MS
QRS DURATION, ECG06: 78 MS
QTC CALCULATION (BEZET), ECG08: 464 MS
RBC # BLD AUTO: 4.72 M/UL (ref 3.8–5.2)
RBC MORPH BLD: ABNORMAL
SODIUM SERPL-SCNC: 143 MMOL/L (ref 136–145)
TROPONIN I BLD-MCNC: <0.04 NG/ML (ref 0–0.08)
VENTRICULAR RATE, ECG03: 98 BPM
WBC # BLD AUTO: 9.9 K/UL (ref 3.6–11)

## 2022-12-30 PROCEDURE — 74011250636 HC RX REV CODE- 250/636: Performed by: STUDENT IN AN ORGANIZED HEALTH CARE EDUCATION/TRAINING PROGRAM

## 2022-12-30 PROCEDURE — 36415 COLL VENOUS BLD VENIPUNCTURE: CPT

## 2022-12-30 PROCEDURE — 99284 EMERGENCY DEPT VISIT MOD MDM: CPT

## 2022-12-30 PROCEDURE — 80053 COMPREHEN METABOLIC PANEL: CPT

## 2022-12-30 PROCEDURE — 83735 ASSAY OF MAGNESIUM: CPT

## 2022-12-30 PROCEDURE — 85025 COMPLETE CBC W/AUTO DIFF WBC: CPT

## 2022-12-30 PROCEDURE — 96374 THER/PROPH/DIAG INJ IV PUSH: CPT

## 2022-12-30 RX ORDER — KETOROLAC TROMETHAMINE 30 MG/ML
15 INJECTION, SOLUTION INTRAMUSCULAR; INTRAVENOUS
Status: COMPLETED | OUTPATIENT
Start: 2022-12-30 | End: 2022-12-30

## 2022-12-30 RX ORDER — CYCLOBENZAPRINE HCL 10 MG
10 TABLET ORAL
Qty: 30 TABLET | Refills: 0 | Status: SHIPPED | OUTPATIENT
Start: 2022-12-30 | End: 2023-01-09

## 2022-12-30 RX ORDER — GUAIFENESIN 100 MG/5ML
324 LIQUID (ML) ORAL
Status: DISCONTINUED | OUTPATIENT
Start: 2022-12-30 | End: 2022-12-30

## 2022-12-30 RX ADMIN — KETOROLAC TROMETHAMINE 15 MG: 30 INJECTION, SOLUTION INTRAMUSCULAR at 16:47

## 2022-12-30 NOTE — ED NOTES
The patient was discharged home by  in stable condition. The patient is alert and oriented, in no respiratory distress and discharge vital signs obtained. The patient's diagnosis, condition and treatment were explained. The patient expressed understanding. No prescriptions given. No work/school note given. A discharge plan has been developed. A  was not involved in the process. Aftercare instructions were given. Pt ambulatory out of the ED. Pt discharged from the ED.

## 2022-12-30 NOTE — ED TRIAGE NOTES
Pt to ER with c/o chest pain with numbness radiating down the left arm since 1530 while doing push ups and running for the .

## 2022-12-30 NOTE — DISCHARGE INSTRUCTIONS
You presented to ED with chest pain and left arm tingling. Work-up for heart attack was done with no concerning findings. Most likely you have muscle skeletal pain with cervical radiculopathy in an ulnar pattern. Recommend heat, stretching, NSAIDs such as Motrin or naproxen, Tylenol 1000 mg every 6 hours and prescribed Flexeril. Follow-up with your PCP. They may be able to provide physical therapy if symptoms not improving with home management. Continue your usual follow-up for your chronic anemia.   Also request additional testing to verify that your serum creatinine/kidney function returns to normal.  Your serum creatinine is mildly elevated at 1.22

## 2022-12-31 NOTE — ED PROVIDER NOTES
Patient is a 78-year-old female who was doing physical training with the 's department and had upper left chest pain followed by left neck pain radiating down into her left arm with tingling over the third fourth and fifth digit. Patient had similar symptoms sometime ago and was admitted to the hospital for possible stroke work-up. Patient has no stroke symptoms at this time. Chest pain has improved. However based on patient's age and risk factors and exertional chest pain ACS work-up is indicated. The history is provided by the patient and the EMS personnel. Chest Pain (Angina)   This is a new problem. The current episode started 1 to 2 hours ago. The problem has been resolved. The problem occurs constantly. The pain is associated with exertion. The pain is present in the left side. The pain is at a severity of 5/10. The pain is mild. The quality of the pain is described as sharp. The pain radiates to the left arm. Associated symptoms include malaise/fatigue and numbness. Pertinent negatives include no diaphoresis. She has tried nothing for the symptoms. The treatment provided no relief. Risk factors include hypertension. Her past medical history does not include DVT or PE. Pertinent negatives include no cardiac catheterization and no cardiac stents. Numbness  This is a recurrent problem. There was left upper extremity focality noted. Associated symptoms include chest pain. Past Medical History:   Diagnosis Date    HTN (hypertension), benign        No past surgical history on file.       Family History:   Problem Relation Age of Onset    Clotting Disorder Mother     Stroke Neg Hx        Social History     Socioeconomic History    Marital status:      Spouse name: Not on file    Number of children: Not on file    Years of education: Not on file    Highest education level: Not on file   Occupational History    Not on file   Tobacco Use    Smoking status: Never    Smokeless tobacco: Not on file   Substance and Sexual Activity    Alcohol use: Not Currently    Drug use: Not on file    Sexual activity: Not on file   Other Topics Concern    Not on file   Social History Narrative    Not on file     Social Determinants of Health     Financial Resource Strain: Not on file   Food Insecurity: Not on file   Transportation Needs: Not on file   Physical Activity: Not on file   Stress: Not on file   Social Connections: Not on file   Intimate Partner Violence: Not on file   Housing Stability: Not on file         ALLERGIES: Patient has no known allergies. Review of Systems   Constitutional:  Positive for fatigue and malaise/fatigue. Negative for activity change, appetite change and diaphoresis. HENT: Negative. Eyes: Negative. Respiratory: Negative. Cardiovascular:  Positive for chest pain. Gastrointestinal: Negative. Endocrine: Negative. Genitourinary: Negative. Musculoskeletal: Negative. Skin: Negative. Allergic/Immunologic: Negative. Neurological:  Positive for numbness. Hematological: Negative. Psychiatric/Behavioral: Negative. Vitals:    12/30/22 1642 12/30/22 1647 12/30/22 1702 12/30/22 1717   BP: (!) 139/90 (!) 152/84 (!) 127/98 134/88   Pulse:       Resp:       Temp:       SpO2: 99% 99% 98% 100%            Physical Exam  Vitals and nursing note reviewed. Constitutional:       General: She is not in acute distress. Appearance: Normal appearance. HENT:      Head: Normocephalic and atraumatic. Right Ear: External ear normal.      Left Ear: External ear normal.      Nose: Nose normal.   Eyes:      Extraocular Movements: Extraocular movements intact. Conjunctiva/sclera: Conjunctivae normal.   Neck:      Comments: Positive Spurling test with radiation of pain and tingling to the left arm and ulnar distribution. Cardiovascular:      Rate and Rhythm: Normal rate. Pulses: Normal pulses.            Radial pulses are 2+ on the right side and 2+ on the left side. Heart sounds: Normal heart sounds. Pulmonary:      Effort: Pulmonary effort is normal.      Breath sounds: Normal breath sounds. Chest:      Chest wall: No deformity or tenderness. Abdominal:      General: Abdomen is flat. There is no distension. Tenderness: There is no abdominal tenderness. Musculoskeletal:         General: No deformity or signs of injury. Normal range of motion. Cervical back: Normal range of motion and neck supple. No tenderness. Skin:     General: Skin is warm and dry. Capillary Refill: Capillary refill takes less than 2 seconds. Neurological:      General: No focal deficit present. Mental Status: She is alert and oriented to person, place, and time. Psychiatric:         Attention and Perception: Attention normal.         Mood and Affect: Mood normal.         Behavior: Behavior normal.        MDM     Amount and/or Complexity of Data Reviewed  Decide to obtain previous medical records or to obtain history from someone other than the patient: yes      ED Course as of 12/31/22 0844   Fri Dec 30, 2022   1652 EKG interpretation:   Rhythm: normal sinus rhythm; and regular . Rate (approx.): 98; Axis: normal; Intervals: normal ; ST/T wave: normal; EKG documented and interpreted by Allan Mccabe. Davide Santacruz MD, Emergency Medicine.     [AL]      ED Course User Index  [AL] Ricardo Shook MD     LABORATORY RESULTS:  Labs Reviewed   CBC WITH AUTOMATED DIFF - Abnormal; Notable for the following components:       Result Value    HGB 8.8 (*)     HCT 28.7 (*)     MCV 60.8 (*)     MCH 18.6 (*)     RDW 18.2 (*)     PLATELET 987 (*)     NRBC 0.2 (*)     ABSOLUTE NRBC 0.02 (*)     IMMATURE GRANULOCYTES 1 (*)     ABS. IMM.  GRANS. 0.1 (*)     All other components within normal limits   METABOLIC PANEL, COMPREHENSIVE - Abnormal; Notable for the following components:    Creatinine 1.22 (*)     eGFR 56 (*)     All other components within normal limits   MAGNESIUM   POC TROPONIN-I       IMAGING RESULTS:  No orders to display       MEDICATIONS GIVEN:  Medications   ketorolac (TORADOL) injection 15 mg (15 mg IntraVENous Given 12/30/22 4764)       Differential diagnosis: Muscle skeletal pain, ACS, cervical radiculopathy, arm tingling    ED physician interpretation of EKG: No STEMI. See my interpretation EKG in ED course above. ED physician interpretation of laboratory results: Lab work with mild elevation in serum creatinine but not consistent with MCKENNA. Likely due to patient's recent exercise mild dehydration. Troponin within normal limits, based on patient's story doubt ACS. Initial troponin unmeasurable, considered second troponin but not indicated at this time. Anemia at 8.8, patient has history of chronic anemia has not responded to iron supplementation. She is being followed in the outpatient setting. Patient not symptomatic with anemia at this time. MDM: Patient is a 52-year-old female presented to ED with chest pain and left arm tingling/numbness most consistent with a cervical radiculopathy with positive Spurling test.  Patient given Toradol with improvement. Chest pain is most likely muscle skeletal from push-ups and pull-ups. Doubt ACS. Patient does have anemia that is significant but she seems to be tolerating it well. Denies any source of bleeding. Outpatient work-up continues for this chronic medical problem. Further personalized recommendations for outpatient care as below. Key discharge instructions and summary of care: You presented to ED with chest pain and left arm tingling. Work-up for heart attack was done with no concerning findings. Most likely you have muscle skeletal pain with cervical radiculopathy in an ulnar pattern. Recommend heat, stretching, NSAIDs such as Motrin or naproxen, Tylenol 1000 mg every 6 hours and prescribed Flexeril. Follow-up with your PCP.   They may be able to provide physical therapy if symptoms not improving with home management. Continue your usual follow-up for your chronic anemia. Also request additional testing to verify that your serum creatinine/kidney function returns to normal.  Your serum creatinine is mildly elevated at 1.22    The patient has been re-evaluated and feeling better. Patient is stable for discharge. All available radiology and laboratory results have been reviewed with patient and/or available family. Patient and/or family verbally conveyed their understanding and agreement of the patient's signs, symptoms, diagnosis, treatment and prognosis and additionally agree to follow-up as recommended in the discharge instructions or to return to the Emergency Department should their condition change or worsen prior to their follow-up appointment. All questions have been answered and patient and/or available family express understanding. IMPRESSION:  1. Cervical radiculopathy    2. 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 Anemia, unspecified type        DISPOSITION: Discharged     Thang Dunbar MD      Procedures

## 2024-03-07 ENCOUNTER — APPOINTMENT (OUTPATIENT)
Facility: HOSPITAL | Age: 44
End: 2024-03-07
Payer: COMMERCIAL

## 2024-03-07 ENCOUNTER — HOSPITAL ENCOUNTER (EMERGENCY)
Facility: HOSPITAL | Age: 44
Discharge: HOME | End: 2024-03-07
Attending: EMERGENCY MEDICINE
Payer: COMMERCIAL

## 2024-03-07 VITALS
WEIGHT: 200 LBS | BODY MASS INDEX: 30.31 KG/M2 | RESPIRATION RATE: 18 BRPM | TEMPERATURE: 97.5 F | SYSTOLIC BLOOD PRESSURE: 138 MMHG | DIASTOLIC BLOOD PRESSURE: 84 MMHG | HEIGHT: 68 IN | HEART RATE: 88 BPM | OXYGEN SATURATION: 97 %

## 2024-03-07 DIAGNOSIS — E87.6 HYPOKALEMIA: ICD-10-CM

## 2024-03-07 DIAGNOSIS — M54.2 CERVICALGIA: ICD-10-CM

## 2024-03-07 DIAGNOSIS — R07.9 CHEST PAIN, UNSPECIFIED TYPE: Primary | ICD-10-CM

## 2024-03-07 LAB
ALBUMIN SERPL-MCNC: 4 G/DL (ref 3.5–5)
ALBUMIN/GLOB SERPL: 0.9 (ref 1.1–2.2)
ALP SERPL-CCNC: 76 U/L (ref 45–117)
ALT SERPL-CCNC: 19 U/L (ref 12–78)
ANION GAP SERPL CALC-SCNC: 7 MMOL/L (ref 5–15)
AST SERPL-CCNC: 16 U/L (ref 15–37)
BASOPHILS # BLD: 0.1 K/UL (ref 0–0.1)
BASOPHILS NFR BLD: 1 % (ref 0–1)
BILIRUB SERPL-MCNC: 1 MG/DL (ref 0.2–1)
BUN SERPL-MCNC: 20 MG/DL (ref 6–20)
BUN/CREAT SERPL: 17 (ref 12–20)
CALCIUM SERPL-MCNC: 9.4 MG/DL (ref 8.5–10.1)
CHLORIDE SERPL-SCNC: 106 MMOL/L (ref 97–108)
CO2 SERPL-SCNC: 24 MMOL/L (ref 21–32)
CREAT SERPL-MCNC: 1.21 MG/DL (ref 0.55–1.02)
D DIMER PPP FEU-MCNC: 0.19 MG/L FEU (ref 0–0.65)
DIFFERENTIAL METHOD BLD: ABNORMAL
EOSINOPHIL # BLD: 0.1 K/UL (ref 0–0.4)
EOSINOPHIL NFR BLD: 1 % (ref 0–7)
ERYTHROCYTE [DISTWIDTH] IN BLOOD BY AUTOMATED COUNT: 16.9 % (ref 11.5–14.5)
GLOBULIN SER CALC-MCNC: 4.7 G/DL (ref 2–4)
GLUCOSE SERPL-MCNC: 137 MG/DL (ref 65–100)
HCT VFR BLD AUTO: 35.7 % (ref 35–47)
HGB BLD-MCNC: 11.7 G/DL (ref 11.5–16)
IMM GRANULOCYTES # BLD AUTO: 0 K/UL (ref 0–0.04)
IMM GRANULOCYTES NFR BLD AUTO: 0 % (ref 0–0.5)
INR PPP: 1 (ref 0.9–1.1)
LYMPHOCYTES # BLD: 2.4 K/UL (ref 0.8–3.5)
LYMPHOCYTES NFR BLD: 26 % (ref 12–49)
MAGNESIUM SERPL-MCNC: 2.3 MG/DL (ref 1.6–2.4)
MCH RBC QN AUTO: 20.5 PG (ref 26–34)
MCHC RBC AUTO-ENTMCNC: 32.8 G/DL (ref 30–36.5)
MCV RBC AUTO: 62.5 FL (ref 80–99)
MONOCYTES # BLD: 0.7 K/UL (ref 0–1)
MONOCYTES NFR BLD: 7 % (ref 5–13)
NEUTS SEG # BLD: 6 K/UL (ref 1.8–8)
NEUTS SEG NFR BLD: 65 % (ref 32–75)
NRBC # BLD: 0 K/UL (ref 0–0.01)
NRBC BLD-RTO: 0 PER 100 WBC
NT PRO BNP: 48 PG/ML
PLATELET # BLD AUTO: 454 K/UL (ref 150–400)
PMV BLD AUTO: 10.4 FL (ref 8.9–12.9)
POTASSIUM SERPL-SCNC: 2.9 MMOL/L (ref 3.5–5.1)
PROT SERPL-MCNC: 8.7 G/DL (ref 6.4–8.2)
PROTHROMBIN TIME: 10.2 SEC (ref 9–11.1)
RBC # BLD AUTO: 5.71 M/UL (ref 3.8–5.2)
RBC MORPH BLD: ABNORMAL
SODIUM SERPL-SCNC: 137 MMOL/L (ref 136–145)
TROPONIN I SERPL HS-MCNC: 17 NG/L (ref 0–51)
TROPONIN I SERPL HS-MCNC: 21 NG/L (ref 0–51)
WBC # BLD AUTO: 9.3 K/UL (ref 3.6–11)

## 2024-03-07 PROCEDURE — 36415 COLL VENOUS BLD VENIPUNCTURE: CPT

## 2024-03-07 PROCEDURE — 6370000000 HC RX 637 (ALT 250 FOR IP): Performed by: STUDENT IN AN ORGANIZED HEALTH CARE EDUCATION/TRAINING PROGRAM

## 2024-03-07 PROCEDURE — 71046 X-RAY EXAM CHEST 2 VIEWS: CPT

## 2024-03-07 PROCEDURE — 96361 HYDRATE IV INFUSION ADD-ON: CPT

## 2024-03-07 PROCEDURE — 6360000002 HC RX W HCPCS: Performed by: STUDENT IN AN ORGANIZED HEALTH CARE EDUCATION/TRAINING PROGRAM

## 2024-03-07 PROCEDURE — 84484 ASSAY OF TROPONIN QUANT: CPT

## 2024-03-07 PROCEDURE — 85025 COMPLETE CBC W/AUTO DIFF WBC: CPT

## 2024-03-07 PROCEDURE — 85610 PROTHROMBIN TIME: CPT

## 2024-03-07 PROCEDURE — 85379 FIBRIN DEGRADATION QUANT: CPT

## 2024-03-07 PROCEDURE — 83880 ASSAY OF NATRIURETIC PEPTIDE: CPT

## 2024-03-07 PROCEDURE — 93005 ELECTROCARDIOGRAM TRACING: CPT | Performed by: EMERGENCY MEDICINE

## 2024-03-07 PROCEDURE — 2580000003 HC RX 258: Performed by: STUDENT IN AN ORGANIZED HEALTH CARE EDUCATION/TRAINING PROGRAM

## 2024-03-07 PROCEDURE — 96374 THER/PROPH/DIAG INJ IV PUSH: CPT

## 2024-03-07 PROCEDURE — 80053 COMPREHEN METABOLIC PANEL: CPT

## 2024-03-07 PROCEDURE — 99285 EMERGENCY DEPT VISIT HI MDM: CPT

## 2024-03-07 PROCEDURE — 83735 ASSAY OF MAGNESIUM: CPT

## 2024-03-07 RX ORDER — POTASSIUM CHLORIDE 750 MG/1
40 TABLET, FILM COATED, EXTENDED RELEASE ORAL ONCE
Status: COMPLETED | OUTPATIENT
Start: 2024-03-07 | End: 2024-03-07

## 2024-03-07 RX ORDER — PREDNISONE 20 MG/1
60 TABLET ORAL
Status: COMPLETED | OUTPATIENT
Start: 2024-03-07 | End: 2024-03-07

## 2024-03-07 RX ORDER — PREDNISONE 10 MG/1
TABLET ORAL
Qty: 1 EACH | Refills: 0 | Status: SHIPPED | OUTPATIENT
Start: 2024-03-07

## 2024-03-07 RX ORDER — 0.9 % SODIUM CHLORIDE 0.9 %
1000 INTRAVENOUS SOLUTION INTRAVENOUS ONCE
Status: COMPLETED | OUTPATIENT
Start: 2024-03-07 | End: 2024-03-07

## 2024-03-07 RX ORDER — KETOROLAC TROMETHAMINE 30 MG/ML
30 INJECTION, SOLUTION INTRAMUSCULAR; INTRAVENOUS
Status: COMPLETED | OUTPATIENT
Start: 2024-03-07 | End: 2024-03-07

## 2024-03-07 RX ORDER — METHOCARBAMOL 750 MG/1
750 TABLET, FILM COATED ORAL 4 TIMES DAILY
Qty: 20 TABLET | Refills: 0 | Status: SHIPPED | OUTPATIENT
Start: 2024-03-07 | End: 2024-03-12

## 2024-03-07 RX ADMIN — PREDNISONE 60 MG: 20 TABLET ORAL at 18:21

## 2024-03-07 RX ADMIN — POTASSIUM CHLORIDE 40 MEQ: 750 TABLET, FILM COATED, EXTENDED RELEASE ORAL at 20:59

## 2024-03-07 RX ADMIN — SODIUM CHLORIDE 1000 ML: 9 INJECTION, SOLUTION INTRAVENOUS at 21:00

## 2024-03-07 RX ADMIN — KETOROLAC TROMETHAMINE 30 MG: 30 INJECTION, SOLUTION INTRAMUSCULAR; INTRAVENOUS at 18:22

## 2024-03-07 ASSESSMENT — PAIN - FUNCTIONAL ASSESSMENT: PAIN_FUNCTIONAL_ASSESSMENT: 0-10

## 2024-03-07 ASSESSMENT — PAIN SCALES - GENERAL: PAINLEVEL_OUTOF10: 7

## 2024-03-07 NOTE — ED TRIAGE NOTES
Patient to ER for complaints of chest pian for the past 2 days. Patient states pain is worse with movement.     Patient reports pain goes down left arm starting at 1400 that's been constant.     Patient reports left arm numbness starting at 1400 today.     Patient states SOB.     Patient hx of asthma, HTN, TIA 2019.    Patient denies blood thinners.     Provider ijeoma HUSSEIN in triage to assess patient.

## 2024-03-08 LAB
EKG ATRIAL RATE: 94 BPM
EKG DIAGNOSIS: NORMAL
EKG P AXIS: 55 DEGREES
EKG P-R INTERVAL: 134 MS
EKG Q-T INTERVAL: 386 MS
EKG QRS DURATION: 78 MS
EKG QTC CALCULATION (BAZETT): 482 MS
EKG R AXIS: 55 DEGREES
EKG T AXIS: 67 DEGREES
EKG VENTRICULAR RATE: 94 BPM

## 2024-03-08 PROCEDURE — 93010 ELECTROCARDIOGRAM REPORT: CPT | Performed by: INTERNAL MEDICINE

## 2024-03-08 ASSESSMENT — ENCOUNTER SYMPTOMS
BACK PAIN: 0
ANAL BLEEDING: 0
SHORTNESS OF BREATH: 0
DIARRHEA: 0
ABDOMINAL DISTENTION: 0
BLOOD IN STOOL: 0
ABDOMINAL PAIN: 0
SORE THROAT: 0
NAUSEA: 0
CONSTIPATION: 0
COUGH: 0
COLOR CHANGE: 0
VOMITING: 0

## 2024-03-08 NOTE — DISCHARGE INSTRUCTIONS
Take prednisone as prescribed.  Take Robaxin if you are experiencing a muscle spasm.  Alternate taking Tylenol and ibuprofen as needed for pain.  Please follow-up with your primary care physician for reevaluation and repeat labs in order to check your potassium.  Also recommend follow-up with cardiology as well as with orthopedic spine specialist.  If you develop new or worsening symptoms return to the ER.

## 2024-03-09 NOTE — ED PROVIDER NOTES
[facility] EMERGENCY DEPT  EMERGENCY DEPARTMENT ENCOUNTER      Pt Name: Macey Gonzalez  MRN: [id number]  Birthdate 1980  Date of evaluation: 3/7/2024  Provider: JOVITA Mitchell    CHIEF COMPLAINT       Chief Complaint   Patient presents with    Chest Pain    Shortness of Breath    Numbness         HISTORY OF PRESENT ILLNESS    Patient is a 43-year-old female with a history of hypertension, asthma and TIA who presents to ED complaining of chest pain that started 2 days prior.  Reports pain is substernal.  States it was initially intermittent but today has become constant and is now radiating down her left arm which started at 2 PM.  She is also reporting shortness of breath and upper back pain as well as left arm tingling.  States she has had similar symptoms of back/neck pain and left arm tingling a few months prior and told it was related to her neck.  She denies any fever, chills, headache, weakness, confusion, speech difficulty, facial asymmetry, abdominal pain, nausea, vomiting, syncope.  She has not taken any medications for pain relief.  She denies any blood thinner medications.            Review of External Medical Records:     Nursing Notes were reviewed.    REVIEW OF SYSTEMS       Review of Systems   Constitutional:  Negative for activity change, appetite change, chills and fever.   HENT:  Negative for congestion and sore throat.    Respiratory:  Negative for cough and shortness of breath.    Cardiovascular:  Positive for chest pain. Negative for palpitations and leg swelling.   Gastrointestinal:  Negative for abdominal distention, abdominal pain, anal bleeding, blood in stool, constipation, diarrhea, nausea and vomiting.   Genitourinary:  Negative for decreased urine volume, difficulty urinating, dysuria, frequency and urgency.   Musculoskeletal:  Positive for neck pain. Negative for arthralgias, back pain, joint swelling and myalgias.   Skin:  Negative for color change and wound.  discussed detail patient.  All questions addressed and answered.    Amount and/or Complexity of Data Reviewed  Labs: ordered. Decision-making details documented in ED Course.  Radiology: ordered.    Risk  Prescription drug management.            REASSESSMENT     ED Course as of 03/08/24 1948   Thu Mar 07, 2024   1900 D-Dimer, Quant: 0.19 [KG]   2003 Potassium(!): 2.9 [KG]   2033 ED EKG Interpretation:  Time: 1738  Rhythm: normal sinus rhythm; and regular . Rate (approx.): 94; Axis: normal; P wave: normal; QRS interval: normal ; ST/T wave: normal; Other findings:   EKG documented by Clarence Walker MD and interpreted by Clarence Walker MD  .   [RG]      ED Course User Index  [KG] Olamide Rashid, PA  [RG] Clarence Walker MD           CONSULTS:  None    PROCEDURES:  Unless otherwise noted below, none     Procedures      FINAL IMPRESSION      1. Chest pain, unspecified type    2. Hypokalemia    3. Cervicalgia          DISPOSITION/PLAN   DISPOSITION Decision To Discharge 03/07/2024 10:13:24 PM      PATIENT REFERRED TO:  Marco Tobar MD  [address] [facility] [address]  [phone]    Schedule an appointment as soon as possible for a visit       Leo Hernandez,   [address] [facility] [address]  [facility] [id number] [phone]    Schedule an appointment as soon as possible for a visit       Perfecto Nick MD  [address] [facility]  [address]  [facility] [place] [facility] [address] [phone]    Schedule an appointment as soon as possible for a visit       [facility] EMERGENCY DEPT  [address] [facility] [place] [address]  [phone]  Go to   If symptoms worsen      DISCHARGE MEDICATIONS:  Discharge Medication List as of 3/7/2024 10:15 PM        START taking these medications    Details   predniSONE 10 MG (21) TBPK Follow instructions in dose pack., Disp-1 each, R-0Normal      methocarbamol (ROBAXIN-750) 750 MG tablet Take 1 tablet by mouth 4 times daily for 5 days, Disp-20 tablet, R-0Normal